# Patient Record
(demographics unavailable — no encounter records)

---

## 2019-08-01 NOTE — EMERGENCY DEPARTMENT REPORT
ED Chest Pain HPI





- General


Chief Complaint: Chest Pain


Stated Complaint: POSS STROKE


Time Seen by Provider: 07/31/19 23:09


Source: patient


Mode of arrival: Ambulatory


Limitations: No Limitations





- History of Present Illness


Initial Comments: 





Reports that he drinks approximately a 6-pack of beer on the weekends. 


MD Complaint: chest pain


-: Gradual, hour(s)


Onset: during rest


Pain Location: substernal


Pain Radiation: none


Severity: mild


Severity scale (0 -10): 3


Quality: aching


Consistency: intermittent


Improves With: nothing


Worsens With: nothing


re: other (reports symptoms of arm spasms and paresthesias in UE when he has 

chest pain).  denies: nausea, vomting, diaphoresis, dyspnea, sense of impending 

doom


Other Symptoms: denies: cough, fever, syncope, rash, acid taste in mouth, leg 

swelling, palpitations, burping





- Related Data


                                  Previous Rx's











 Medication  Instructions  Recorded  Last Taken  Type


 


Acetaminophen/Codeine 1 tab PO Q6H PRN #20 tab 03/20/15 Unknown Rx





[Acetaminophen-Codeine #3 TAB]    


 


Ibuprofen [Motrin] 600 mg PO Q8H PRN #40 tablet 03/20/15 Unknown Rx


 


Sulfamethoxazole/Trimethoprim 1 each PO BID #20 tablet 03/20/15 Unknown Rx





[Bactrim Ds]    











                                    Allergies











Allergy/AdvReac Type Severity Reaction Status Date / Time


 


No Known Allergies Allergy   Verified 03/20/15 01:47














Heart Score





- HEART Score


History: Moderately suspicious


EKG: Non-specific


Age: 45-65


Risk factors: 1-2 risk factors


Troponin: < normal limit


HEART Score: 4





ED Review of Systems


ROS: 


Stated complaint: POSS STROKE


Other details as noted in HPI





Other: 





GENERAL: No weight change, fatigue, weakness, fever, chills, or night sweats


SKIN: No changes in skin or hair, no itching, no rashes, no jaundice


HEAD: No trauma, headache, or visual changes


EYES: No blurriness, tearing, itching, acute visual loss, conjunctival 

discoloration, or scleral icterus


EARS: No hearing loss, tinnitus, vertigo, or earache


NOSE: No rhinorrhea, stuffiness, sneezing, itching, or epistaxis


MOUTH: No bleeding gums, hoarseness, sore throat, or swelling


CARDIAC: Chest Pain.   No new murmur, palpitations, dyspnea on exertion, 

orthopnea, PND, or edema


RESPIRATORY: No shortness of breath, wheeze, cough, sputum production, 

hemoptysis, pneumonia, asthma, bronchitis, or emphysema


GI: No change in appetite, nausea, vomiting, dysphagia, change in bowel 

frequency, diarrhea, constipation, bleeding, hematemesis, melena, hematochezia, 

or abdominal pain


URINARY: No frequency, urgency, polyuria, dysuria, hematuria, or incontinence


MUSCULOSKELETAL: Bilateral UE spasms.  No muscle weakness, joint stiffness, decr

ease in range of motion, redness, swelling


NEUROLOGIC: Tingling bilateral UE.  No loss of sensation, numbness, tremors, 

weakness, paralysis, seizures


HEMATOLOGIC: No anemia, easy bruising, bleeding, petechiae, or purpura


ENDOCRINE: No hot or cold intolerance, sweating, polyuria, polydipsia or, polyp

hagia no thyroid problems


PSYCHIATRIC: No change in mood, no anxiety, no depression





ED Past Medical Hx





- Past Medical History


Previous Medical History?: Yes


Hx Hypertension: Yes


Hx Diabetes: Yes





- Surgical History


Past Surgical History?: No





- Social History


Smoking Status: Current Every Day Smoker


Substance Use Type: Alcohol





- Medications


Home Medications: 


                                Home Medications











 Medication  Instructions  Recorded  Confirmed  Last Taken  Type


 


Acetaminophen/Codeine 1 tab PO Q6H PRN #20 tab 03/20/15  Unknown Rx





[Acetaminophen-Codeine #3 TAB]     


 


Ibuprofen [Motrin] 600 mg PO Q8H PRN #40 tablet 03/20/15  Unknown Rx


 


Sulfamethoxazole/Trimethoprim 1 each PO BID #20 tablet 03/20/15  Unknown Rx





[Bactrim Ds]     














ED Physical Exam





- General


Limitations: No Limitations





- Other


Other exam information: 





GENERAL: Patient in no acute distress


HEAD: Normocephalic, atraumatic


EYES: PERRLA, EOM intact, no scleral icterus, no papilledema, no conjunctival 

hemorrhage, visual fields and acuity wnl,


NOSE: No tenderness, discharge, sinus tenderness


MOUTH: No erythema, bleeding, exudate


HEART: Regular rate and rhythm, no murmur, S1-S2 are auscultated, pulses are 

symmetric


LUNGS: No wheezing, rales, rhonchi, bilateral breath sounds


ABDOMEN: Normal bowel sounds, no tenderness, no rebound, no guarding, no masses,

no CVA tenderness


MUSCULOSKELETAL: Normal joint range of motion, no redness, no swelling, no te

nderness


NEUROLOGIC: GCS 15, Alert and Oriented x3, Cranial nerves intact, normal 

sensation, normal strength, normal gait, no cerebellar deficit


PSYCHIATRIC: No homicidal or suicidal ideation, no anxiety, no depression, no 

hallucinations


SKIN: Skin is warm and dry, no wounds, no rashes





ED Course


                                   Vital Signs











  07/31/19 07/31/19 08/01/19





  22:56 23:18 00:00


 


Temperature 98 F 98.1 F 


 


Pulse Rate 113 H 89 88


 


Respiratory 20 22 24





Rate   


 


Blood Pressure 117/87  118/70


 


Blood Pressure  126/78 





[Left]   


 


O2 Sat by Pulse 98 100 100





Oximetry   














  08/01/19





  01:00


 


Temperature 


 


Pulse Rate 86


 


Respiratory 31 H





Rate 


 


Blood Pressure 128/77


 


Blood Pressure 





[Left] 


 


O2 Sat by Pulse 100





Oximetry 














ED Medical Decision Making





- Lab Data


Result diagrams: 


                                 07/31/19 23:11





                                 07/31/19 23:11








                         Laboratory Results - last 24 hr











  07/31/19 07/31/19 07/31/19





  22:57 23:11 23:11


 


WBC   6.0 


 


RBC   4.50 


 


Hgb   12.7 


 


Hct   37.8 


 


MCV   84 


 


MCH   28 


 


MCHC   34 


 


RDW   14.9 


 


Plt Count   136 L 


 


Sodium    132 L


 


Potassium    4.2


 


Chloride    94.1 L


 


Carbon Dioxide    23


 


Anion Gap    19


 


BUN    12


 


Creatinine    1.0


 


Estimated GFR    > 60


 


BUN/Creatinine Ratio    12


 


Glucose    190 H


 


POC Glucose  206 H  


 


Calcium    9.3


 


Magnesium   


 


Total Bilirubin    1.90 H


 


AST    159 H


 


ALT    248 H


 


Alkaline Phosphatase    135 H


 


Troponin T    < 0.010


 


Total Protein    7.1


 


Albumin    3.7 L


 


Albumin/Globulin Ratio    1.1


 


Urine Color   


 


Urine Turbidity   


 


Urine pH   


 


Ur Specific Gravity   


 


Urine Protein   


 


Urine Glucose (UA)   


 


Urine Ketones   


 


Urine Blood   


 


Urine Nitrite   


 


Urine Bilirubin   


 


Urine Urobilinogen   


 


Ur Leukocyte Esterase   


 


Urine WBC (Auto)   


 


Urine RBC (Auto)   


 


U Epithel Cells (Auto)   


 


Urine Mucus   


 


Urine Opiates Screen   


 


Urine Methadone Screen   


 


Ur Barbiturates Screen   


 


Ur Phencyclidine Scrn   


 


Ur Amphetamines Screen   


 


U Benzodiazepines Scrn   


 


Urine Cocaine Screen   


 


U Marijuana (THC) Screen   


 


Drugs of Abuse Note   


 


Plasma/Serum Alcohol   














  07/31/19 07/31/19 07/31/19





  23:11 23:59 23:59


 


WBC   


 


RBC   


 


Hgb   


 


Hct   


 


MCV   


 


MCH   


 


MCHC   


 


RDW   


 


Plt Count   


 


Sodium   


 


Potassium   


 


Chloride   


 


Carbon Dioxide   


 


Anion Gap   


 


BUN   


 


Creatinine   


 


Estimated GFR   


 


BUN/Creatinine Ratio   


 


Glucose   


 


POC Glucose   


 


Calcium   


 


Magnesium  1.80  


 


Total Bilirubin   


 


AST   


 


ALT   


 


Alkaline Phosphatase   


 


Troponin T   


 


Total Protein   


 


Albumin   


 


Albumin/Globulin Ratio   


 


Urine Color   Yellow 


 


Urine Turbidity   Clear 


 


Urine pH   6.0 


 


Ur Specific Gravity   1.012 


 


Urine Protein   <15 mg/dl 


 


Urine Glucose (UA)   Neg 


 


Urine Ketones   Neg 


 


Urine Blood   Neg 


 


Urine Nitrite   Neg 


 


Urine Bilirubin   Neg 


 


Urine Urobilinogen   4.0 


 


Ur Leukocyte Esterase   Neg 


 


Urine WBC (Auto)   1.0 


 


Urine RBC (Auto)   4.0 


 


U Epithel Cells (Auto)   1.0 


 


Urine Mucus   Few 


 


Urine Opiates Screen    Presumptive negative


 


Urine Methadone Screen    Presumptive negative


 


Ur Barbiturates Screen    Presumptive negative


 


Ur Phencyclidine Scrn    Presumptive negative


 


Ur Amphetamines Screen    Presumptive negative


 


U Benzodiazepines Scrn    Presumptive negative


 


Urine Cocaine Screen    Presumptive negative


 


U Marijuana (THC) Screen    Presumptive negative


 


Drugs of Abuse Note    Disclamer


 


Plasma/Serum Alcohol   














  08/01/19 08/01/19





  00:05 02:06


 


WBC  


 


RBC  


 


Hgb  


 


Hct  


 


MCV  


 


MCH  


 


MCHC  


 


RDW  


 


Plt Count  


 


Sodium  


 


Potassium  


 


Chloride  


 


Carbon Dioxide  


 


Anion Gap  


 


BUN  


 


Creatinine  


 


Estimated GFR  


 


BUN/Creatinine Ratio  


 


Glucose  


 


POC Glucose  


 


Calcium  


 


Magnesium  


 


Total Bilirubin  


 


AST  


 


ALT  


 


Alkaline Phosphatase  


 


Troponin T   < 0.010


 


Total Protein  


 


Albumin  


 


Albumin/Globulin Ratio  


 


Urine Color  


 


Urine Turbidity  


 


Urine pH  


 


Ur Specific Gravity  


 


Urine Protein  


 


Urine Glucose (UA)  


 


Urine Ketones  


 


Urine Blood  


 


Urine Nitrite  


 


Urine Bilirubin  


 


Urine Urobilinogen  


 


Ur Leukocyte Esterase  


 


Urine WBC (Auto)  


 


Urine RBC (Auto)  


 


U Epithel Cells (Auto)  


 


Urine Mucus  


 


Urine Opiates Screen  


 


Urine Methadone Screen  


 


Ur Barbiturates Screen  


 


Ur Phencyclidine Scrn  


 


Ur Amphetamines Screen  


 


U Benzodiazepines Scrn  


 


Urine Cocaine Screen  


 


U Marijuana (THC) Screen  


 


Drugs of Abuse Note  


 


Plasma/Serum Alcohol  < 0.01 














- EKG Data


When compared to previous EKG there are: no significant change





- Radiology Data


Radiology results: report reviewed





- Medical Decision Making





At 0511 patient comfortable.  Plan admit for further evaluation.  Lung changes 

but no symptoms of pneumonia.  Plan evaluate for chest pain rule out.  Dr. Tierney

 updated and accepts admission. 


Critical care attestation.: 


If time is entered above; I have spent that time in minutes in the direct care 

of this critically ill patient, excluding procedure time.








ED Disposition


Clinical Impression: 


 Hyperbilirubinemia, Elevated LFTs





Chest pain


Qualifiers:


 Chest pain type: unspecified Qualified Code(s): R07.9 - Chest pain, unspecified





Pneumonia


Qualifiers:


 Pneumonia type: due to unspecified organism Laterality: unspecified laterality 

Lung location: unspecified part of lung Qualified Code(s): J18.9 - Pneumonia, 

unspecified organism





Disposition: DC-09 OP ADMIT IP TO THIS HOSP


Is pt being admited?: Yes


Condition: Stable


Instructions:  Chest Pain (ED), Bacterial Pneumonia (ED)


[facility] 


[facility] - 3-5 Days


Time of Disposition: 05:06

## 2019-08-01 NOTE — CAT SCAN REPORT
CT chest w con, CT abdomen pelvis w con 



INDICATION / CLINICAL INFORMATION:

Chest Pain, abdomen and pelvic pain, elevated liver function tests.



TECHNIQUE:

100 cc of Omnipaque 350 was administered intravenously All CT scans at this location are performed us
ing CT dose reduction for ALARA by means of automated exposure control. 



COMPARISON:

None available.



FINDINGS:

Diffuse airspace disease is seen in the left apex there are a few bulla seen lower in the left lung. 
No other significant parenchymal abnormality is identified. No enlarged mediastinal or hilar lymph no
kannan are identified. No significant skeletal abnormality is seen in the thorax.



In the abdomen, no free fluid is seen. There are some fluid-filled loops of small bowel present. Diff
use fatty infiltration is present in the liver without focal abnormality. The spleen, kidneys, pancre
as and adrenal glands are normal. No enlarged retroperitoneal lymph nodes are seen. Diffuse atheroscl
erotic changes present without evidence of an aneurysm.



In the pelvis, no free fluid is seen. No enlarged lymph nodes are identified. The bladder and the fabiana
endix are normal. No significant skeletal abnormality is identified.



IMPRESSION:



1. Airspace disease in the left apex. There are a few bulla present more inferiorly in the left lung

2. Fluid-filled loops of small bowel could represent mild enteritis

3. Diffuse fatty infiltration in the liver without focal abnormality

4. Atherosclerotic change without evidence of an aneurysm



Signer Name: Chucho Gonzalez MD FACR 

Signed: 8/1/2019 4:31 AM

 Workstation Name: [id number]

## 2019-08-01 NOTE — HISTORY AND PHYSICAL REPORT
CHIEF COMPLAINT:  Chest pain.



HISTORY OF PRESENT ILLNESS:  The patient is a 60-year-old male who said he was

having sharp retrosternal chest pain that started yesterday.  Pain is associated

with shortness of breath, nausea, but no vomiting.  The pain is also associated

with tingling sensation in the left upper extremity.  There was no history of

dizziness.  No history of diaphoresis and no history of cough or fever and

patient presented for evaluation.



PAST MEDICAL HISTORY:  Pertinent for diabetes mellitus.  Also, the patient has

past history of hypertension.



FAMILY HISTORY:  Pertinent for coronary artery disease in the mother.



SOCIAL HISTORY:  The patient smokes cigarettes.  Does not drink alcohol and does

not use illicit drugs.



MEDICATIONS:  The patient's home medications involve Tylenol No. 3 one by mouth

every 6 hours as needed for pain, Motrin 600 mg by mouth every 8 hours as needed

for pain, Bactrim-DS 1 by mouth twice daily as needed for pain.



ALLERGIES:  There are no known drug allergies.



REVIEW OF SYSTEMS:

CONSTITUTIONAL:  There is no fever, no chills, no diaphoresis.

HEENT:  There is no headache or sore throat.

CARDIOVASCULAR SYSTEM:  Chest pain is present.  No orthopnea.

RESPIRATORY SYSTEM:  Shortness of breath is present.  No cough.

GASTROINTESTINAL SYSTEM:  There is nausea, but no vomiting.  No abdominal pain,

diarrhea or constipation.

NEUROLOGICAL SYSTEM:  Numbness and tingling sensation in the left upper

extremity noted.  No dizziness, no altered mental status.

MUSCULOSKELETAL SYSTEM:  There is no joint pain or swelling.

DERMATOLOGICAL SYSTEM:  There is no skin rash or itching.

GENITOURINARY SYSTEM:  There is no dysuria, hematuria, or flank pain.

Rest of system review is normal.



PHYSICAL EXAMINATION:

GENERAL:  At the time of exam, the patient was found to be alert, oriented x 3

and not in acute distress.

VITAL SIGNS:  At the initial time of presentation shows temperature of 98

degrees Fahrenheit, pulse of 113, respirations 20, blood pressure 117/87, O2 sat

of 98% on room air.

HEENT:  Showed pupils to be equal, round, reactive to light and accommodating. 

Extraocular muscles are intact.

NECK:  Supple with no JVD or carotid bruit.

CARDIOVASCULAR SYSTEM:  Showed normal first and second heart sounds with no

gallops or murmurs.

RESPIRATORY SYSTEM:  Show good air entry on both sides of the lungs with no

abnormal breath sounds.

GASTROINTESTINAL SYSTEM:  Show abdomen to be full, soft, nontender with no

organomegaly or rigidity.

NEUROLOGICAL:  Shows no focal deficit.

MUSCULOSKELETAL SYSTEM:  Show no joint swelling or tenderness.

DERMATOLOGICAL SYSTEM:  Show no skin rash.

GENITOURINARY SYSTEM:  Showing no costovertebral angle tenderness.



PERTINENT LABORATORY DATA AND IMAGING STUDIES:  The patient has CT of the

abdomen and pelvis done that shows airspace disease in the left apex of the

heart.  Also, there is finding of fluid filled loops of small bowel that the

radiologist say may represent mild enteritis.  There is finding of diffuse fatty

infiltrate in the liver without focal abnormality.  Also, the CT abdomen and

pelvis with contrast shows atherosclerotic change without evidence of an

aneurysm.  Also, the patient has CT of the chest done that shows the same thing

as CT of the abdomen and pelvis. The patient had chest x-ray done that shows

increased density in the left apical region with recommendation to do a CT of

the chest.  The patient's lab result shows CBC with normal white count, normal

hemoglobin and normal hematocrit with low platelet count of 136,000.  The

patient's chemistry shows a low sodium level of 132 with low chloride level of

94.1 and elevated blood glucose of 206.  The patient's liver transaminases show

high AST of 159 and high ALT of 248 with elevated total bilirubin of 1.9 and the

patient's urinalysis came back unremarkable.  Troponin level came back normal

and patient's toxicology screen was unremarkable and the patient's plasma/serum

alcohol level was unremarkable.



DIAGNOSES:

1.  Chest pain.

2.  Transaminitis.

3.  Enteritis.

4.  Left apex airspace disease.



PLAN OF CARE:

1.  The patient will be admitted to telemetry.

2.  The patient will have serial cardiac enzymes involving troponin, total CK,

and CK-MB checked every 6 hours x 2 more levels.

3.  The patient will have Lexiscan stress test done this morning and will remain

n.p.o. for the stress test.

4.  The patient will have Accu-Chek q.4 hours followed by low-dose sliding scale

using regular insulin treatment.

5.  The patient will be on Tylenol 650 mg by mouth every 4 hours for fever and

headache.

6.  The patient will be on aspirin 325 mg by mouth daily.

7.  The patient will be on IV morphine 2 mg every 5 minutes as needed for chest

pain and will be on nitro paste half inch to anterior chest wall q.i.d.  Also,

the patient will be on Nitrostat 0.4 mg sublingual every 5 minutes as needed for

breakthrough chest pain.

8.  The patient will be on IV Zofran 4 mg every 8 hours for nausea and vomiting.

9.  The patient will be on IV Levaquin 750 mg daily for treatment of enteritis

and also the patient will be on IV metronidazole 500 mg every 8 hours for

treatment of enteritis.

10.  The patient will have Lexiscan stress test done this morning.

11. gastroenterology consult with [facility] for evaluation of fatty 
liver with Transaminitis





DD: 08/01/2019 05:44

DT: 08/01/2019 05:56

JOB# [id number]  [id number]

OCN/NTS

MTDD

## 2019-08-01 NOTE — CAT SCAN REPORT
CT chest w con, CT abdomen pelvis w con 



INDICATION / CLINICAL INFORMATION:

Chest Pain, abdomen and pelvic pain, elevated liver function tests.



TECHNIQUE:

100 cc of Omnipaque 350 was administered intravenously All CT scans at this location are performed us
ing CT dose reduction for ALARA by means of automated exposure control. 



COMPARISON:

None available.



FINDINGS:

Diffuse airspace disease is seen in the left apex there are a few bulla seen lower in the left lung. 
No other significant parenchymal abnormality is identified. No enlarged mediastinal or hilar lymph no
kannan are identified. No significant skeletal abnormality is seen in the thorax.



In the abdomen, no free fluid is seen. There are some fluid-filled loops of small bowel present. Diff
use fatty infiltration is present in the liver without focal abnormality. The spleen, kidneys, pancre
as and adrenal glands are normal. No enlarged retroperitoneal lymph nodes are seen. Diffuse atheroscl
erotic changes present without evidence of an aneurysm.



In the pelvis, no free fluid is seen. No enlarged lymph nodes are identified. The bladder and the fabiana
endix are normal. No significant skeletal abnormality is identified.



IMPRESSION:



1. Airspace disease in the left apex. There are a few bulla present more inferiorly in the left lung

2. Fluid-filled loops of small bowel could represent mild enteritis

3. Diffuse fatty infiltration in the liver without focal abnormality

4. Atherosclerotic change without evidence of an aneurysm



Signer Name: Chucho Gonzalez MD FACR 

Signed: 8/1/2019 4:31 AM

 Workstation Name: [id number]

## 2019-08-01 NOTE — DISCHARGE SUMMARY
Providers





- Providers


Date of Admission: 


08/01/19 05:07





Date of discharge: 08/01/19


Attending physician: 


KYLE GALLAGHER





                                        





08/01/19 06:48


Consult to Physician [CONS] Routine 


   Comment: 


   Consulting Provider: RITU BLAKE


   Physician Instructions: 


   Reason For Exam: Fatty Liver with Transaminitis











Primary care physician: 


[facility] PRITESH TOBIN MD








Hospitalization


Condition: Stable


Hospital course: 


Patient is a 59 yo man with a history of type 2 DM, hypertension, tobacco and 

alcohol dependency who presented with left sided chest pains after lifting heavy

 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 He developed sharp arm pains and leg cramping followed by chest

 pains. It appears he is in denial about how much he drinks beers. Initially he 

mentioned drinking beers (12 pack) over the weekend then he admits to drinking 

24 oz beer almost daily (he states that he doesn't drink beer every single day 

so he is not an Alcoholic). CT chest/abd/pelvis reviewed with Radiologist. 





Discharge Diagnoses:


Chest pains, stress test negative, most likely costochondritis


Fatty liver with transaminitis, most likely related to ETOH, counseling done, 

needs outpat GI referral 


Left lung apex disease, d/w Radiologist Dr. Randolph, unlikely pna, appears 

chronic. 


No Enteritis, no history of diarrhea. 


Tobacco dependency,  on stopping


ETOH dependency  on stopping


Malnutrition suspected





Disposition: DC-01 TO HOME OR SELFCARE


Time spent for discharge: 35 minutes





Core Measure Documentation





- Palliative Care


Palliative Care/ Comfort Measures: Not Applicable





- Core Measures


Any of the following diagnoses?: none





- VTE Discharge Requirements


Deep Vein Thrombosis/Pulmonary Embolism Present on Admission: No


Has pt received <5 days of overlap therapy or INR<2.0: No


Anticoagulant overlap therapy prescribed at discharge: No


Contraindication No Overlap Therapy order at DC: Not Indicated





Exam





- Physical Exam


Narrative exam: 


Gen: WDWN, NAD, Awake, Alert, Orientated 


HEENT: NCAT, EOMI, PERRL, OP Clear 


Neck: supple, no adenopathy, no thyromegaly, no JVD 


CVS/Heart: RRR, normal S1S2, pulses present bilaterally 


Chest/Lungs: CTA B, Symmetrical chest expansion, good air entry bilaterally, 

reproducible left chest wall tenderness with touch


GI/Abdomen: soft, NTND, good bowel sounds, no guarding or rebound 


/Bladder: no suprapubic tenderness, no CVA or paraspinal tenderness 


Extermity/Skin: no c/c/e, no obvious rash 


MSK: FROM x 4 


Neuro: CN 2-12 grossly intact, no new focal deficits 


Psych: calm 





- Constitutional


Vitals: 


                                        











Temp Pulse Resp BP Pulse Ox


 


 98.1 F   76   18   113/68   100 


 


 08/01/19 06:25  08/01/19 12:09  08/01/19 12:09  08/01/19 12:09  08/01/19 12:09














Plan


Activity: other (no strenous activity)


Diet: regular


Special Instructions: smoking cessation


Additional Instructions: Take over the counter Thiamine/Vitamin B1 daily with 

folic acid


[facility]: 


[facility] - [facility]-5 Days


DALTON MCNEAL MD [Staff Physician] - 7 Days

## 2019-08-01 NOTE — CONSULTATION
History of Present Illness





- Reason for Consult


Consult date: 08/01/19


abnormal LFTs


Requesting physician: KYLE GALLAGHER





- History of Present Illness


82 yo  and [place] man, admitted with chest pain with negative cardiac

evaluation, who was found to have abnormal LFTs with AST/ALT/ALP/TBili = 

159/248/135/1.9, and fatty liver on CT.  Pt drinks 12 pk beer/wk.  Denies known 

hx of liver disease.  Hepatitis serologies negative.  He is on cholesterol meds 

at home.  No family hx of colon cancer.


He denies abd pain, N/V.  He states he had NISSA x 1-2 months, but is recovering, 

and states he lost ~20#.  He is on Nexium with good control of GERD.  No change 

in BMs, no dysphagia or early satiety.  No GI bleed.





Meds reviewed.








Past History


Past Medical History: diabetes, hypertension, hyperlipidemia


Past Surgical History: No surgical history


Social history: smoking, alcohol abuse


Family history: no significant family history





Medications and Allergies


                                    Allergies











Allergy/AdvReac Type Severity Reaction Status Date / Time


 


No Known Allergies Allergy   Verified 03/20/15 01:47











                                Home Medications











 Medication  Instructions  Recorded  Confirmed  Last Taken  Type


 


Esomeprazole Magnesium [Nexium 20 mg PO DAILY 08/01/19 08/01/19 Unknown History





24Hr]     


 


metFORMIN [Glucophage] 500 mg PO BID 08/01/19 08/01/19 Unknown History











Active Meds: 


Active Medications





Acetaminophen (Tylenol)  650 mg PO Q4H PRN


   PRN Reason: Pain MILD(1-3)/Fever >100.5/HA


Aspirin (Aspirin)  325 mg PO QDAY [facility]ANDA


   Last Admin: 08/01/19 10:21 Dose:  325 mg


   Documented by: 


Dextrose (D50w (25gm) Syringe)  50 ml IV PRN PRN


   PRN Reason: Hypoglycemia


Levofloxacin/Dextrose (Levaquin 750mg/150ml)  750 mg in 150 mls @ 100 mls/hr IV 

Q24HR [facility]; Protocol


Metronidazole (Flagyl 500 Mg/100 Ml)  500 mg in 100 mls @ 100 mls/hr IV Q8H SHAWANDA;

Protocol


Insulin Human Regular (Humulin R)  0 units SUB-Q Q4H [facility]; Protocol


   Last Admin: 08/01/19 10:33 Dose:  Not Given


   Documented by: 


Morphine Sulfate (Morphine)  2 mg IV Q5MIN PRN


   PRN Reason: Chest Pain unrelieved by NTG


Nitroglycerin (Nitro-Bid 2%)  0.5 inch TP QIDNTG [facility]; Protocol


   Last Admin: 08/01/19 10:21 Dose:  0.5 inch


   Documented by: 


Ondansetron HCl (Zofran)  4 mg IV Q8H PRN


   PRN Reason: Nausea And Vomiting


Sodium Chloride (Sodium Chloride Flush Syringe 10 Ml)  10 ml IV BID [facility]


   Last Admin: 08/01/19 13:53 Dose:  10 ml


   Documented by: 


Sodium Chloride (Sodium Chloride Flush Syringe 10 Ml)  10 ml IV PRN PRN


   PRN Reason: LINE FLUSH











Review of Systems


All systems: negative (as per HPI)





Exam





- Constitutional


Vitals: 


                                        











Temp Pulse Resp BP Pulse Ox


 


 98.1 F   76   18   113/68   100 


 


 08/01/19 06:25  08/01/19 12:09  08/01/19 12:09  08/01/19 12:09  08/01/19 12:09











General appearance: Present: no acute distress





- EENT


Eyes: Present: PERRL, EOM intact


ENT: hearing intact





- Respiratory


Respiratory effort: normal


Respiratory: bilateral: CTA





- Cardiovascular


Rhythm: regular


Heart Sounds: Present: S1 & S2





- Extremities


Extremities: No edema





- Abdominal


General gastrointestinal: Present: soft, non-tender





Results





- Labs


CBC & Chem 7: 


                                 07/31/19 23:11





                                 07/31/19 23:11


Labs: 


                              Abnormal lab results











  07/31/19 07/31/19 07/31/19 Range/Units





  22:57 23:11 23:11 


 


Plt Count   136 L   (140-440)  K/mm3


 


Sodium    132 L  (137-145)  mmol/L


 


Chloride    94.1 L  ()  mmol/L


 


Glucose    190 H  ()  mg/dL


 


POC Glucose  206 H    ()  


 


Total Bilirubin    1.90 H  (0.1-1.2)  mg/dL


 


AST    159 H  (5-40)  units/L


 


ALT    248 H  (7-56)  units/L


 


Alkaline Phosphatase    135 H  ()  units/L


 


Total Creatine Kinase     ()  units/L


 


CK-MB (CK-2)     (0.0-4.0)  ng/mL


 


Albumin    3.7 L  (3.9-5)  g/dL














  08/01/19 08/01/19 Range/Units





  06:18 06:44 


 


Plt Count    (140-440)  K/mm3


 


Sodium    (137-145)  mmol/L


 


Chloride    ()  mmol/L


 


Glucose    ()  mg/dL


 


POC Glucose  109 H   ()  


 


Total Bilirubin    (0.1-1.2)  mg/dL


 


AST    (5-40)  units/L


 


ALT    (7-56)  units/L


 


Alkaline Phosphatase    ()  units/L


 


Total Creatine Kinase   351 H  ()  units/L


 


CK-MB (CK-2)   4.7 H  (0.0-4.0)  ng/mL


 


Albumin    (3.9-5)  g/dL














- Imaging and Cardiology


CT scan - abdomen: report reviewed





Assessment and Plan


1.  Abnormal LFTs - etiology unclear.  May be due to statins, which I believe 

the pt is taking at home.  Could be acute, or other chronic condition.


- okay to D/C home from GI standpoint


- no statins and no beer


- f/u as outpatient in 2-3 wks for repeat labs and further evaluation

## 2019-08-02 NOTE — TREADMILL REPORT
NUCLEAR STRESS TEST



The patient is here for a nuclear stress test.



REFERRING PHYSICIAN:  Hospitalist service.



PROTOCOL:  The patient was brought to the stress lab in a postoperative state,

given 10 mCi of technetium 99m at rest.  The patient underwent rest imaging. 

The patient underwent Lexiscan stress test per standard protocol.  At peak

stress, the patient was given 26 mCi technetium 99m.  Shortly thereafter, the

patient underwent stress imaging.  Raw imaging reveals mild GI artifact with no

significant motion artifact.  SPECT imaging examined carefully in horizontal

long axis, vertical long axis, short axis views.  There is normal homogenous

uptake of radioisotope in all reported segments.  No evidence of significant

fixed or reversible perfusion defects suggestive of prior infarction or

ischemia.  Gated wall motion reveals normal systolic thickening, calculated

ejection fraction 67%.  No TID.



CONCLUSIONS:

1.  Normal myocardial perfusion scan without evidence of active ischemia or

prior infarction.

2.  Normal left ventricular systolic performance without evidence of transient

ischemic dilatation or stress-induced segmental wall motion abnormalities.

3.  Lexiscan stress test reported separately.





DD: 08/01/2019 10:55

DT: 08/01/2019 12:33

JOB# [id number]  [id number]

SBIRIS/TONY

## 2019-08-23 NOTE — CAT SCAN REPORT
CT lumbar spine wo con



INDICATION / CLINICAL INFORMATION:

60 years Male; fall, back pain.



TECHNIQUE:

Axial CT images of the lumbar spine were obtained after administration of intrathecal contrast.  Sagi
ttal and coronal reformatted images were produced. All CT scans at this location are performed using 
CT dose reduction for ALARA by means of automated exposure control.



COMPARISON: 

None available.



FINDINGS:



POST-SURGICAL CHANGES: None.



ALIGNMENT: No significant abnormality.

VERTEBRAE: Healing transverse process fractures seen on the right at L3 and L4.  There may be mild lo
ss of height at L5. No definitive signs of acute injury appreciated at this level.



Mild facet hypertrophy seen at various levels.



INTERVERTEBRAL DISCS: Minimal disc bulge seen at L4-5 resulting in mild thecal sac narrowing. There i
s also mild to moderate foraminal narrowing bilaterally at this level without impingement.



PARASPINAL SOFT TISSUES: No significant abnormality.



ADDITIONAL FINDINGS: Atherosclerotic disease seen in the aorta and its branches.



There is fusion of the sacroiliac joints bilaterally, anteriorly.



IMPRESSION:

1. No signs of acute bony trauma to the lumbar spine.  Subacute injury suspected, as described above.




Signer Name: Marcelo Richards MD, III 

Signed: 8/23/2019 5:02 PM

 Workstation Name: [id number]

## 2019-08-23 NOTE — CAT SCAN REPORT
CT ABDOMEN AND PELVIS WITHOUT CONTRAST



INDICATION / CLINICAL INFORMATION:

fall, left flank pain.



TECHNIQUE:

Axial CT images were obtained through the abdomen and pelvis without IV contrast.  All CT scans at [facility] are performed using CT dose reduction for ALARA by means of automated exposure control. 



COMPARISON:

CT scan dated 8/1/2019



FINDINGS:

LOWER CHEST: Scarring is noted in the left lung base. There are calcified pleural plaques in the left
 base.

LIVER: Unchanged

GALLBLADDER: No significant abnormality.  

BILE DUCTS: No significant abnormality.

PANCREAS: No significant abnormality.

SPLEEN: No significant abnormality.

ADRENALS: No significant abnormality.

RIGHT KIDNEY and URETER: No significant abnormality.

LEFT KIDNEY and URETER: No significant abnormality.



STOMACH and SMALL BOWEL: No significant abnormality. 

COLON: Moderate amount stool is noted in the colon. 

APPENDIX: No significant abnormality.  

PERITONEUM: No free fluid. No free air. No fluid collection.

LYMPH NODES: No significant adenopathy.

AORTA and ARTERIES: Atherosclerotic calcifications are noted in the aorta and iliac arteries. The aor
ta is normal in diameter. 

IVC and VEINS: No significant abnormality.



URINARY BLADDER: No significant abnormality.

REPRODUCTIVE ORGANS: No significant abnormality.



ADDITIONAL FINDINGS: None.



SKELETAL SYSTEM: No acute osseous abnormalities are seen.



IMPRESSION:

1. There is no obstruction, inflammation, or free air. No acute abnormality is seen in the abdomen or
 pelvis. No intra-abdominal organ injury is identified.



Signer Name: Dick Guzman MD 

Signed: 8/23/2019 4:22 PM

 Workstation Name: [id number]

## 2019-08-23 NOTE — EMERGENCY DEPARTMENT REPORT
HPI





<ANDREIA WERNER - Last Filed: 08/23/19 22:09>





- HPI


HPI: 





60-year-old -American male presents to the emergency department with a 

complaint of some left flank pain and left lateral back pain after the patient 

says he fell off a ladder and hit those areas on the counter in the kitchen.  He

denies hitting his head or any loss of consciousness.  He denies any numbness or

paresthesias or any neurological deficits.  He denies any past medical history. 

Patient denies any current alcohol or illicit drug use.  He has not taken 

anything, and received anything, for his symptoms prior to arrival.





<ELAINE DUBOIS - Last Filed: 08/23/19 22:24>





- General


Chief Complaint: Back Pain/Injury


Time Seen by Provider: 08/23/19 14:04





ED Past Medical Hx





<ANDREIA WERNER - Last Filed: 08/23/19 22:09>





- Past Medical History


Hx Hypertension: Yes


Hx Heart Attack/AMI: No


Hx Congestive Heart Failure: No


Hx Diabetes: Yes


Hx Deep Vein Thrombosis: No


Hx GERD: Yes


Hx Liver Disease: No


Hx Asthma: No


Hx COPD: No





- Surgical History


Hx Coronary Stent: No


Hx Pacemaker: No


Hx Internal Defibrillator: No





- Social History


Smoking Status: Never Smoker


Substance Use Type: Alcohol





<ELAINE DUBOIS - Last Filed: 08/23/19 22:24>





- Medications


Home Medications: 


                                Home Medications











 Medication  Instructions  Recorded  Confirmed  Last Taken  Type


 


Esomeprazole Magnesium [Nexium 20 mg PO DAILY 08/01/19 08/23/19 08/22/19 History





24Hr]     


 


metFORMIN [Glucophage] 500 mg PO BID 08/01/19 08/23/19 08/22/19 History


 


Lisinopril [Zestril] 5 mg PO QDAY 08/23/19 08/23/19 08/22/19 History














ED Review of Systems


ROS: 


Stated complaint: BACK PAIN/ETOH


Other details as noted in HPI








<ANDREIA WERNER - Last Filed: 08/23/19 22:09>


ROS: 


Stated complaint: BACK PAIN/ETOH


Other details as noted in HPI





Comment: All other systems reviewed and negative


Constitutional: denies: chills, fever


Eyes: denies: eye pain, vision change


ENT: denies: ear pain, throat pain


Respiratory: denies: cough, shortness of breath


Cardiovascular: denies: chest pain, palpitations


Gastrointestinal: abdominal pain (left flank).  denies: vomiting


Genitourinary: denies: dysuria, discharge


Musculoskeletal: back pain.  denies: joint swelling


Skin: denies: rash, change in color


Neurological: denies: headache, weakness, numbness, paresthesias





<ELAINE DUBOIS S - Last Filed: 08/23/19 22:24>





Physical Exam





- Physical Exam


Vital Signs: 


                                   Vital Signs











  08/23/19 08/23/19





  13:56 17:11


 


Temperature 98 F 


 


Pulse Rate 110 H 74


 


Respiratory 16 16





Rate  


 


Blood Pressure 145/91 


 


Blood Pressure  135/75





[Left]  


 


O2 Sat by Pulse 96 96





Oximetry  














<ANDREIA WERNER - Last Filed: 08/23/19 22:09>





- Physical Exam


Vital Signs: 


                                   Vital Signs











  08/23/19





  13:56


 


Temperature 98 F


 


Pulse Rate 110 H


 


Respiratory 16





Rate 


 


Blood Pressure 145/91


 


O2 Sat by Pulse 96





Oximetry 











Physical Exam: 





GENERAL: The patient is well-developed well-nourished.


HENT: Normocephalic.  Atraumatic.    Patient has moist mucous membranes.


EYES: Extraocular motions are intact.  


NECK: Supple. Trachea is midline.


CHEST/LUNGS: Clear to auscultation.  There is no respiratory distress noted.


HEART/CARDIOVASCULAR: Regular.  There is no tachycardia.  There is no murmur.


ABDOMEN: Abdomen is soft, nontender.  Patient has normal bowel sounds.  There is

 no abdominal distention.


SKIN: Skin is warm and dry.


NEURO: The patient is awake, alert, and oriented.  The patient is cooperative.  

The patient has no focal neurologic deficits.  Normal speech. CN II - XII 

grossly intact. 


MUSCULOSKELETAL: Full range of motion. No TTP to the extremities or any obvious 

deformity.


BACK: No midline thoracic or lumbar TTP, step off or deformity. There is some 

reproducible left lateral back TTP.  





<ELAINE DUBOIS S - Last Filed: 08/23/19 22:24>





ED Course


                                   Vital Signs











  08/23/19 08/23/19





  13:56 17:11


 


Temperature 98 F 


 


Pulse Rate 110 H 74


 


Respiratory 16 16





Rate  


 


Blood Pressure 145/91 


 


Blood Pressure  135/75





[Left]  


 


O2 Sat by Pulse 96 96





Oximetry  














<ANDREIA WERNER - Last Filed: 08/23/19 22:09>


                                   Vital Signs











  08/23/19





  13:56


 


Temperature 98 F


 


Pulse Rate 110 H


 


Respiratory 16





Rate 


 


Blood Pressure 145/91


 


O2 Sat by Pulse 96





Oximetry 














<ELAINE DUBOIS - Last Filed: 08/23/19 22:24>





ED Medical Decision Making





- Lab Data


Result diagrams: 


                                 08/23/19 14:45





                                 08/23/19 14:45





- Medical Decision Making





I reassessed Mr. Dacosta.  He is awake alert and ambulatory without difficulty. 

 After 8 hours of observation here in emergency department he is clinically 

sober.  Discharged to self-care.





<ANDREIA WERNER - Last Filed: 08/23/19 22:09>





- Lab Data


Result diagrams: 


                                 08/23/19 14:45





                                 08/23/19 14:45





- Radiology Data


Radiology results: report reviewed





CT of the lumbar spine and CT of the abd/pelvis did not show any acute processes

 as read by radiology. 





- Medical Decision Making





The patient presented with pain to the left flank and lateral back after a fall 

off a ladder. No obvious signs of trauma on examination. CT of the lumbar spine 

and CT of the abd/pelvis did not show any acute process or pathology. Labs 

unremarkable except for alcohol of 0.25. He was given IVF including banana bag. 

The patient was signed out to my colleague to follow until the patient is sober 

or someone can come to get him from the emergency department. He was given arnaldo valdovinos instructions and will return to the ED with any worsening of his 

symptoms or any acute distress. 





- Differential Diagnosis


Fracture, contusion, muscle spasm, lacerations





<ELAINE DUBOIS - Last Filed: 08/23/19 22:24>


Critical care attestation.: 


If time is entered above; I have spent that time in minutes in the direct care 

of this critically ill patient, excluding procedure time.








<ANDREIA WERNER - Last Filed: 08/23/19 22:09>


Critical Care Time: No


Critical care attestation.: 


If time is entered above; I have spent that time in minutes in the direct care 

of this critically ill patient, excluding procedure time.








<ELAINE DUBOIS - Last Filed: 08/23/19 22:24>





ED Disposition


Is pt being admited?: No


Does the pt Need Aspirin: No





<ANDREIA WERNER - Last Filed: 08/23/19 22:09>


Is pt being admited?: No


Time of Disposition: 22:24





<ELAINE DUBOIS - Last Filed: 08/23/19 22:24>


Clinical Impression: 


 Flank pain





Fall


Qualifiers:


 Encounter type: initial encounter Qualified Code(s): W19.XXXA - Unspecified 

fall, initial encounter





Alcohol intoxication


Qualifiers:


 Complication of substance-induced condition: uncomplicated Qualified Code(s): 

F10.920 - Alcohol use, unspecified with intoxication, uncomplicated





Back pain


Qualifiers:


 Back pain location: low back pain Chronicity: acute 





Disposition: DC-01 TO HOME OR SELFCARE


Condition: Stable


Instructions:  Alcohol Intoxication (ED), Back Pain (ED), Fall Prevention (ED)


Additional Instructions: 


Please stop drinking alcohol.  Please follow up with a primary care physician in

 the next few days.  Return to the emergency Department with any worsening of 

your symptoms or any acute distress.


Referrals: 


PRIMARY CARE,MD [Primary Care Provider] - 2-3 Days


ASPEN GIBSON MD [Staff Physician] - 2-3 Days


[facility] [Outside] - 2-3 Days

## 2019-08-28 NOTE — EMERGENCY DEPARTMENT REPORT
ED General Adult HPI





- General


Chief complaint: Abdominal Pain


Stated complaint: ABD PAIN


Time Seen by Provider: 19 14:16


Source: patient, EMS (ems notes not available at time of  chart dictation), RN 

notes reviewed, old records reviewed


Mode of arrival: Stretcher


Limitations: No Limitations, Other (patient appears to be intoxicated)





- History of Present Illness


Initial comments: 





This is a 60-year-old gentleman.  This patient is not known to this provider 

previously.  His past medical history includes type 2 diabetes, hypertension, 

tobacco use, alcohol dependency, chronic pleural disease.  Patient recently 

admitted to this hospital, and has had extensive workup.  He also has a history 

of alcoholism.  He presents to the ER with multiple complaints.  He is a poor 

historian.  He complains of left upper quadrant abdominal pain, left-sided back 

pain, coughing up, nausea, sore throat.  He denies chest pain.  He denies 

exertional shortness of breath.  As per verbal report from nursing staff who is 

taking care of the patient, the patient is quite calm when there is nobody in 

front of his room, but, when there are people in front of his room, he becomes 

quite animated.  The patient is not endorsed any complaints of homicidality or 

suicidality.  He denies urinary symptoms at this time.





He is not accompanied by any friends or family at this time.


-: Gradual, hour(s)


Location: mouth, back, abdomen


Severity scale (0 -10): 10


Quality: aching


Consistency: other (patient not able to describe consistency)


Improves with: none


Worsens with: none





- Related Data


                                Home Medications











 Medication  Instructions  Recorded  Confirmed  Last Taken


 


Esomeprazole Magnesium [Nexium 20 mg PO DAILY 19





24Hr]    


 


metFORMIN [Glucophage] 500 mg PO BID 19


 


Lisinopril [Zestril] 5 mg PO QDAY 19








                                  Previous Rx's











 Medication  Instructions  Recorded  Last Taken  Type


 


Multivitamin with Folic Acid [Cvs 400 mcg PO QDAY #30 tablet 19 Unknown Rx





One Daily Essential Tablet]    


 


chlordiazePOXIDE [Librium] 25 mg PO Q6H PRN #25 capsule 19 Unknown Rx











                                    Allergies











Allergy/AdvReac Type Severity Reaction Status Date / Time


 


No Known Allergies Allergy   Verified 03/20/15 01:47














ED Review of Systems


ROS: 


Stated complaint: ABD PAIN


Other details as noted in HPI





Constitutional: denies: fever


Eyes: denies: eye discharge


ENT: throat pain, congestion


Respiratory: cough


Cardiovascular: denies: syncope


Genitourinary: denies: dysuria


Musculoskeletal: back pain, arthralgia


Skin: denies: lesions


Psychiatric: anxiety.  denies: suicidal thoughts





ED Past Medical Hx





- Past Medical History


Hx Hypertension: Yes


Hx Heart Attack/AMI: No


Hx Congestive Heart Failure: No


Hx Diabetes: Yes


Hx Deep Vein Thrombosis: No


Hx GERD: Yes


Hx Liver Disease: No


Hx Asthma: No


Hx COPD: No





- Surgical History


Hx Coronary Stent: No


Hx Pacemaker: No


Hx Internal Defibrillator: No





- Social History


Smoking Status: Current Every Day Smoker


Substance Use Type: Alcohol





- Medications


Home Medications: 


                                Home Medications











 Medication  Instructions  Recorded  Confirmed  Last Taken  Type


 


Esomeprazole Magnesium [Nexium 20 mg PO DAILY 19 History





24Hr]     


 


metFORMIN [Glucophage] 500 mg PO BID 19 History


 


Lisinopril [Zestril] 5 mg PO QDAY 19 History


 


Multivitamin with Folic Acid [Cvs 400 mcg PO QDAY #30 tablet 19  Unknown 

Rx





One Daily Essential Tablet]     


 


chlordiazePOXIDE [Librium] 25 mg PO Q6H PRN #25 capsule 19  Unknown Rx














ED Physical Exam





- General


Limitations: No Limitations


General appearance: alert, in no apparent distress, anxious





- Head


Head exam: Present: atraumatic, normocephalic





- Eye


Eye exam: Present: normal appearance, EOMI.  Absent: nystagmus





- ENT


ENT exam: Present: normal orophraynx, mucous membranes moist, normal external 

ear exam





- Neck


Neck exam: Present: normal inspection, full ROM.  Absent: tenderness, 

meningismus





- Respiratory


Respiratory exam: Present: normal lung sounds bilaterally.  Absent: respiratory 

distress





- Cardiovascular


Cardiovascular Exam: Present: regular rate, normal rhythm, normal heart sounds. 

 Absent: bradycardia, tachycardia, irregular rhythm, systolic murmur, diastolic 

murmur, rubs, gallop





- GI/Abdominal


GI/Abdominal exam: Present: soft, normal bowel sounds.  Absent: distended, 

tenderness, guarding, rebound, rigid





- Rectal


Rectal exam: Present: deferred





- Extremities Exam


Extremities exam: Present: normal inspection, full ROM, other (2+ pulses noted 

in the bilateral upper, lower extremities.  Compartments soft.  No long bony 

tenderness.  The pelvis is stable.).  Absent: pedal edema, joint swelling, calf 

tenderness





- Back Exam


Back exam: Present: normal inspection, full ROM.  Absent: tenderness, CVA 

tenderness (R), CVA tenderness (L), paraspinal tenderness, vertebral tenderness





- Neurological Exam


Neurological exam: Present: alert, normal gait, other (Extraocular movements 

intact.  Tongue midline.  No facial droop.  Facial sensation intact to light 

touch in the V1, V2, V3 distribution bilaterally.  5 and 5 strength in 4 extre

mities..  Sensation is intact to light touch in 4 extremities.).  Absent: motor 

sensory deficit





- Psychiatric


Psychiatric exam: Present: anxious





- Skin


Skin exam: Present: warm, dry, intact, normal color.  Absent: rash





ED Course


                                   Vital Signs











  19





  13:34 13:43 14:17


 


Temperature 97.9 F 97.9 F 


 


Pulse Rate 100 H 100 H 


 


Respiratory 20 20 20





Rate   


 


Blood Pressure   


 


Blood Pressure  152/90 





[Left]   


 


O2 Sat by Pulse 100 100 100





Oximetry   














  19





  16:01 18:11 19:18


 


Temperature 97.1 F L 97.9 F 98.0 F


 


Pulse Rate 92 H 96 H 79


 


Respiratory 20 20 18





Rate   


 


Blood Pressure   


 


Blood Pressure 141/77 151/89 126/76





[Left]   


 


O2 Sat by Pulse 100 99 99





Oximetry   














  19





  20:14 03:28


 


Temperature  98.6 F


 


Pulse Rate 79 93 H


 


Respiratory  16





Rate  


 


Blood Pressure 126/76 


 


Blood Pressure  124/74





[Left]  


 


O2 Sat by Pulse  99





Oximetry  














- Reevaluation(s)


Reevaluation #1: 





19 16:32


Differential diagnosis, including not limited to: GERD, gastritis, hiatal 

hernia, pancreatitis, alcohol intoxication, urinary tract infection, renal colic





Assessment and plan: 60-year-old gentleman, multiple visits to this hospital for

 multiple complaints, intoxicated today clinically, no evidence of head trauma, 

found to have blood alcohol level of 0.33, remainder screening laboratory 

studies otherwise unremarkable.  Patient will be placed on ER hold.








As needed nonsedating medications for pain have been ordered.  A CT scan of the 

brain, abdomen  pelvis ordered.  We will need to await clinical sobriety.


Reevaluation #2: 





19 19:08


CT scan of the abdomen and pelvis is negative for acute disease.  Patient will 

be observed in the emergency room pending clinical sobriety, or until a sober 

and responsible adult can come by and pick him up.  Care will be transferred to 

the overnight physician, Dr. NIRU Cabral, to monitor patient for clinical sobriety 

and reassessment for suitability for discharge.





ED Medical Decision Making





- Lab Data


Result diagrams: 


                                 19 14:18





                                 19 14:18








                                   Vital Signs











  19





  13:34 13:43 14:17


 


Temperature 97.9 F 97.9 F 


 


Pulse Rate 100 H 100 H 


 


Respiratory 20 20 20





Rate   


 


Blood Pressure  152/90 





[Left]   


 


O2 Sat by Pulse 100 100 100





Oximetry   











                                   Lab Results











  19 Range/Units





  14:18 14:18 14:30 


 


WBC  6.4    (4.5-11.0)  K/mm3


 


RBC  4.26    (3.65-5.03)  M/mm3


 


Hgb  11.8    (11.8-15.2)  gm/dl


 


Hct  35.3 L    (35.5-45.6)  %


 


MCV  83 L    (84-94)  fl


 


MCH  28    (28-32)  pg


 


MCHC  33    (32-34)  %


 


RDW  15.2    (13.2-15.2)  %


 


Plt Count  237    (140-440)  K/mm3


 


Lymph % (Auto)  40.2 H    (13.4-35.0)  %


 


Mono % (Auto)  5.3    (0.0-7.3)  %


 


Eos % (Auto)  1.2    (0.0-4.3)  %


 


Baso % (Auto)  0.9    (0.0-1.8)  %


 


Lymph #  2.6    (1.2-5.4)  K/mm3


 


Mono #  0.3    (0.0-0.8)  K/mm3


 


Eos #  0.1    (0.0-0.4)  K/mm3


 


Baso #  0.1    (0.0-0.1)  K/mm3


 


Seg Neutrophils %  52.4    (40.0-70.0)  %


 


Seg Neutrophils #  3.4    (1.8-7.7)  K/mm3


 


Sodium   144   (137-145)  mmol/L


 


Potassium   3.9   (3.6-5.0)  mmol/L


 


Chloride   107.0   ()  mmol/L


 


Carbon Dioxide   23   (22-30)  mmol/L


 


Anion Gap   18   mmol/L


 


BUN   16   (9-20)  mg/dL


 


Creatinine   0.7 L   (0.8-1.5)  mg/dL


 


Estimated GFR   > 60   ml/min


 


BUN/Creatinine Ratio   23   %


 


Glucose   173 H   ()  mg/dL


 


Calcium   8.6   (8.4-10.2)  mg/dL


 


Urine Color    Colorless  (Yellow)  


 


Urine Turbidity    Clear  (Clear)  


 


Urine pH    7.0  (5.0-7.0)  


 


Ur Specific Gravity    1.004  (1.003-1.030)  


 


Urine Protein    <15 mg/dl  (Negative)  mg/dL


 


Urine Glucose (UA)    Neg  (Negative)  mg/dL


 


Urine Ketones    Neg  (Negative)  mg/dL


 


Urine Blood    Neg  (Negative)  


 


Urine Nitrite    Neg  (Negative)  


 


Urine Bilirubin    Neg  (Negative)  


 


Urine Urobilinogen    < 2.0  (<2.0)  mg/dL


 


Ur Leukocyte Esterase    Neg  (Negative)  


 


Urine WBC (Auto)    1.0  (0.0-6.0)  /HPF


 


Urine RBC (Auto)    1.0  (0.0-6.0)  /HPF


 


Urine Bacteria (Auto)    1+  (Negative)  /HPF











                                   Lab Results











  19 Range/Units





  14:18 14:18 14:30 


 


WBC  6.4    (4.5-11.0)  K/mm3


 


RBC  4.26    (3.65-5.03)  M/mm3


 


Hgb  11.8    (11.8-15.2)  gm/dl


 


Hct  35.3 L    (35.5-45.6)  %


 


MCV  83 L    (84-94)  fl


 


MCH  28    (28-32)  pg


 


MCHC  33    (32-34)  %


 


RDW  15.2    (13.2-15.2)  %


 


Plt Count  237    (140-440)  K/mm3


 


Lymph % (Auto)  40.2 H    (13.4-35.0)  %


 


Mono % (Auto)  5.3    (0.0-7.3)  %


 


Eos % (Auto)  1.2    (0.0-4.3)  %


 


Baso % (Auto)  0.9    (0.0-1.8)  %


 


Lymph #  2.6    (1.2-5.4)  K/mm3


 


Mono #  0.3    (0.0-0.8)  K/mm3


 


Eos #  0.1    (0.0-0.4)  K/mm3


 


Baso #  0.1    (0.0-0.1)  K/mm3


 


Seg Neutrophils %  52.4    (40.0-70.0)  %


 


Seg Neutrophils #  3.4    (1.8-7.7)  K/mm3


 


Sodium   144   (137-145)  mmol/L


 


Potassium   3.9   (3.6-5.0)  mmol/L


 


Chloride   107.0   ()  mmol/L


 


Carbon Dioxide   23   (22-30)  mmol/L


 


Anion Gap   18   mmol/L


 


BUN   16   (9-20)  mg/dL


 


Creatinine   0.7 L   (0.8-1.5)  mg/dL


 


Estimated GFR   > 60   ml/min


 


BUN/Creatinine Ratio   23   %


 


Glucose   173 H   ()  mg/dL


 


Calcium   8.6   (8.4-10.2)  mg/dL


 


Magnesium     (1.7-2.3)  mg/dL


 


Total Creatine Kinase     ()  units/L


 


Lipase     (13-60)  units/L


 


Urine Color    Colorless  (Yellow)  


 


Urine Turbidity    Clear  (Clear)  


 


Urine pH    7.0  (5.0-7.0)  


 


Ur Specific Gravity    1.004  (1.003-1.030)  


 


Urine Protein    <15 mg/dl  (Negative)  mg/dL


 


Urine Glucose (UA)    Neg  (Negative)  mg/dL


 


Urine Ketones    Neg  (Negative)  mg/dL


 


Urine Blood    Neg  (Negative)  


 


Urine Nitrite    Neg  (Negative)  


 


Urine Bilirubin    Neg  (Negative)  


 


Urine Urobilinogen    < 2.0  (<2.0)  mg/dL


 


Ur Leukocyte Esterase    Neg  (Negative)  


 


Urine WBC (Auto)    1.0  (0.0-6.0)  /HPF


 


Urine RBC (Auto)    1.0  (0.0-6.0)  /HPF


 


Urine Bacteria (Auto)    1+  (Negative)  /HPF


 


Salicylates     (2.8-20.0)  mg/dL


 


Acetaminophen     (10.0-30.0)  ug/mL


 


Plasma/Serum Alcohol     (0-0.07)  %














  19 Range/Units





  15:15 15:15 15:15 


 


WBC     (4.5-11.0)  K/mm3


 


RBC     (3.65-5.03)  M/mm3


 


Hgb     (11.8-15.2)  gm/dl


 


Hct     (35.5-45.6)  %


 


MCV     (84-94)  fl


 


MCH     (28-32)  pg


 


MCHC     (32-34)  %


 


RDW     (13.2-15.2)  %


 


Plt Count     (140-440)  K/mm3


 


Lymph % (Auto)     (13.4-35.0)  %


 


Mono % (Auto)     (0.0-7.3)  %


 


Eos % (Auto)     (0.0-4.3)  %


 


Baso % (Auto)     (0.0-1.8)  %


 


Lymph #     (1.2-5.4)  K/mm3


 


Mono #     (0.0-0.8)  K/mm3


 


Eos #     (0.0-0.4)  K/mm3


 


Baso #     (0.0-0.1)  K/mm3


 


Seg Neutrophils %     (40.0-70.0)  %


 


Seg Neutrophils #     (1.8-7.7)  K/mm3


 


Sodium     (137-145)  mmol/L


 


Potassium     (3.6-5.0)  mmol/L


 


Chloride     ()  mmol/L


 


Carbon Dioxide     (22-30)  mmol/L


 


Anion Gap     mmol/L


 


BUN     (9-20)  mg/dL


 


Creatinine     (0.8-1.5)  mg/dL


 


Estimated GFR     ml/min


 


BUN/Creatinine Ratio     %


 


Glucose     ()  mg/dL


 


Calcium     (8.4-10.2)  mg/dL


 


Magnesium  1.90    (1.7-2.3)  mg/dL


 


Total Creatine Kinase  212 H    ()  units/L


 


Lipase  19    (13-60)  units/L


 


Urine Color     (Yellow)  


 


Urine Turbidity     (Clear)  


 


Urine pH     (5.0-7.0)  


 


Ur Specific Gravity     (1.003-1.030)  


 


Urine Protein     (Negative)  mg/dL


 


Urine Glucose (UA)     (Negative)  mg/dL


 


Urine Ketones     (Negative)  mg/dL


 


Urine Blood     (Negative)  


 


Urine Nitrite     (Negative)  


 


Urine Bilirubin     (Negative)  


 


Urine Urobilinogen     (<2.0)  mg/dL


 


Ur Leukocyte Esterase     (Negative)  


 


Urine WBC (Auto)     (0.0-6.0)  /HPF


 


Urine RBC (Auto)     (0.0-6.0)  /HPF


 


Urine Bacteria (Auto)     (Negative)  /HPF


 


Salicylates   < 0.3 L   (2.8-20.0)  mg/dL


 


Acetaminophen    < 5.0 L  (10.0-30.0)  ug/mL


 


Plasma/Serum Alcohol     (0-0.07)  %














  19 Range/Units





  15:15 


 


WBC   (4.5-11.0)  K/mm3


 


RBC   (3.65-5.03)  M/mm3


 


Hgb   (11.8-15.2)  gm/dl


 


Hct   (35.5-45.6)  %


 


MCV   (84-94)  fl


 


MCH   (28-32)  pg


 


MCHC   (32-34)  %


 


RDW   (13.2-15.2)  %


 


Plt Count   (140-440)  K/mm3


 


Lymph % (Auto)   (13.4-35.0)  %


 


Mono % (Auto)   (0.0-7.3)  %


 


Eos % (Auto)   (0.0-4.3)  %


 


Baso % (Auto)   (0.0-1.8)  %


 


Lymph #   (1.2-5.4)  K/mm3


 


Mono #   (0.0-0.8)  K/mm3


 


Eos #   (0.0-0.4)  K/mm3


 


Baso #   (0.0-0.1)  K/mm3


 


Seg Neutrophils %   (40.0-70.0)  %


 


Seg Neutrophils #   (1.8-7.7)  K/mm3


 


Sodium   (137-145)  mmol/L


 


Potassium   (3.6-5.0)  mmol/L


 


Chloride   ()  mmol/L


 


Carbon Dioxide   (22-30)  mmol/L


 


Anion Gap   mmol/L


 


BUN   (9-20)  mg/dL


 


Creatinine   (0.8-1.5)  mg/dL


 


Estimated GFR   ml/min


 


BUN/Creatinine Ratio   %


 


Glucose   ()  mg/dL


 


Calcium   (8.4-10.2)  mg/dL


 


Magnesium   (1.7-2.3)  mg/dL


 


Total Creatine Kinase   ()  units/L


 


Lipase   (13-60)  units/L


 


Urine Color   (Yellow)  


 


Urine Turbidity   (Clear)  


 


Urine pH   (5.0-7.0)  


 


Ur Specific Gravity   (1.003-1.030)  


 


Urine Protein   (Negative)  mg/dL


 


Urine Glucose (UA)   (Negative)  mg/dL


 


Urine Ketones   (Negative)  mg/dL


 


Urine Blood   (Negative)  


 


Urine Nitrite   (Negative)  


 


Urine Bilirubin   (Negative)  


 


Urine Urobilinogen   (<2.0)  mg/dL


 


Ur Leukocyte Esterase   (Negative)  


 


Urine WBC (Auto)   (0.0-6.0)  /HPF


 


Urine RBC (Auto)   (0.0-6.0)  /HPF


 


Urine Bacteria (Auto)   (Negative)  /HPF


 


Salicylates   (2.8-20.0)  mg/dL


 


Acetaminophen   (10.0-30.0)  ug/mL


 


Plasma/Serum Alcohol  0.33 H  (0-0.07)  %














- EKG Data


-: EKG Interpreted by Me


EKG shows normal: sinus rhythm


Rate: normal





- EKG Data





19 16:31


This is a sinus rhythm, 93 bpm, normal axis,  ms, left ventricular 

hypertrophy, poor R progression, the EKG is abnormal, the EKG is not consistent 

with ST elevation myocardial infarction, the EKG is unchanged from prior EKG











- Radiology Data


Radiology results: report reviewed, image reviewed











Referring Physician: ELAINE DUBOIS


 


Patient Name: JANAK NESS


 


Patient ID: [id number]


 


YOB: 1959


 


Sex: Male


 


Accession: [id number]


 


Report Date: 2019


 


Report Status: Finalized








[place] [address] Cat

 Scan Report Signed Patient: JANAK NESS MR#: [id number] : 1959 

Acct:[id number] Age/Sex: 60 / M ADM Date: 19 Loc: ED Attending Dr: 

Ordering Physician: ELAINE DUBOIS DO Date of Service: 19 Procedure(s): CT

 abdomen pelvis wo con Accession Number(s): [id number] cc: ELAINE DUBOIS DO CT 

ABDOMEN AND PELVIS WITHOUT CONTRAST INDICATION / CLINICAL INFORMATION: fall, 

left flank pain. TECHNIQUE: Axial CT images were obtained through the abdomen 

and pelvis without IV contrast. All CT scans at this location are performed usin

g CT dose reduction for ALARA by means of automated exposure control. 

COMPARISON: CT scan dated 2019 FINDINGS: LOWER CHEST: Scarring is noted in 

the left lung base. There are calcified pleural plaques in the left base. LIVER:

 Unchanged GALLBLADDER: No significant abnormality. BILE DUCTS: No significant 

abnormality. PANCREAS: No significant abnormality. SPLEEN: No significant 

abnormality. ADRENALS: No significant abnormality. RIGHT KIDNEY and URETER: No 

significant abnormality. LEFT KIDNEY and URETER: No significant abnormality. 

STOMACH and SMALL BOWEL: No significant abnormality. COLON: Moderate amount 

stool is noted in the colon. APPENDIX: No significant abnormality. PERITONEUM: 

No free fluid. No free air. No fluid collection. LYMPH NODES: No significant 

adenopathy. AORTA and ARTERIES: Atherosclerotic calcifications are noted in the 

aorta and iliac arteries. The aorta is normal in diameter. IVC and VEINS: No 

significant abnormality. URINARY BLADDER: No significant abnormality. REP

RODUCTIVE ORGANS: No significant abnormality. ADDITIONAL FINDINGS: None. 

SKELETAL SYSTEM: No acute osseous abnormalities are seen. IMPRESSION: 1. There 

is no obstruction, inflammation, or free air. No acute abnormality is seen in 

the abdomen or pelvis. No intra-abdominal organ injury is identified. Signer 

Name: Dick Guzman MD Signed: 2019 4:22 PM Workstation Name: [id number] 

Transcribed By:  Dictated By: Dick Guzman MD Electronically 

Authenticated By: Dick Guzman MD Signed Date/Time: 19 1622 








[facility] [address] [place] [address] Cat

 Scan Report Signed Patient: JANAK NESS MR#: [id number] : 1959 

Acct:[id number] Age/Sex: 60 / M ADM Date: 19 Loc: ED Attending Dr: 

Ordering Physician: PATRICIA SY MD Date of Service: 19 

Procedure(s): CT abdomen pelvis w con Accession Number(s): [id number] cc: PATRICIA SY MD CT chest w con, CT abdomen pelvis w con INDICATION / CLINICAL 

INFORMATION: Chest Pain, abdomen and pelvic pain, elevated liver function tests.

 TECHNIQUE: 100 cc of Omnipaque 350 was administered intravenously All CT scans 

at this location are performed using CT dose reduction for ALARA by means of 

automated exposure control. COMPARISON: None available. FINDINGS: Diffuse 

airspace disease is seen in the left apex there are a few bulla seen lower in 

the left lung. No other significant parenchymal abnormality is identified. No 

enlarged mediastinal or hilar lymph nodes are identified. No significant 

skeletal abnormality is seen in the thorax. In the abdomen, no free fluid is 

seen. There are some fluid-filled loops of small bowel present. Diffuse fatty 

infiltration is present in the liver without focal abnormality. The spleen, 

kidneys, pancreas and adrenal glands are normal. No enlarged retroperitoneal 

lymph nodes are seen. Diffuse atherosclerotic changes present without evidence 

of an aneurysm. In the pelvis, no free fluid is seen. No enlarged lymph nodes 

are identified. The bladder and the appendix are normal. No significant skeletal

 abnormality is identified. IMPRESSION: 1. Airspace disease in the left apex. 

There are a few bulla present more inferiorly in the left lung 2. Fluid-filled 

loops of small bowel could represent mild enteritis 3. Diffuse fatty 

infiltration in the liver without focal abnormality 4. Atherosclerotic change 

without evidence of an aneurysm Signer Name: Chucho Gonzalez MD FACR Signed: 

2019 4:31 AM Workstation Name: [id number] Transcribed By: MS Dictated By: 

Chucho Gonzalez MD Electronically Authenticated By: Chucho Gonzalez MD 

Signed Date/Time: 19 0431 








Print Report











Referring Physician: MARTINE JARRELL 


 


Patient Name: JANAK NESS 


 


Patient ID: [id number] 


 


YOB: 1959 


 


Sex: Male 


 


Accession: [id number] 


 


Report Date: 2019 


 


Report Status: Finalized 


 


Findings


[address] [address] 

XRay Report Signed Patient: JANAK NESS MR#: [id number] : 1959 

Acct:[id number] Age/Sex: 60 / M ADM Date: 19 Loc: ED Attending Dr: 

Ordering Physician: MARTINE JARRELL MD Date of Service: 19 Procedure(s):

 XR chest 1V ap Accession Number(s): [id number] cc: MARTINE JARRELL MD Fluoro T

amee In Minutes: CHEST 1 VIEW 3:28 PM INDICATION / CLINICAL INFORMATION: Cough 

and back pain. Left abdominal pain. COMPARISON: 2019. FINDINGS: SUPPORT 

DEVICES: None. HEART / MEDIASTINUM: The heart size and pulmonary vasculature are

 normal. LUNGS / PLEURA: There is localized pleuroparenchymal disease in the 

left upper hemithorax, mildly improved since the prior exam. The right lung is 

clear. No pneumothorax. ADDITIONAL FINDINGS: No significant additional findings.

 IMPRESSION: Improving pleuroparenchymal disease in the left upper hemithorax. 

Signer Name: Arturo Ferreira MD Signed: 2019 4:03 PM Workstation Name: 

[id number] Transcribed By: RT Dictated By: Arturo Ferreira MD Electronically 

Authenticated By: Arturo Ferreira MD Signed Date/Time: 19 6287   














BRAIN / INTRACRANIAL CONTENTS: No acute hemorrhage, mass effect, midline shift, 

or hydrocephalus. No appreciable acute large territorial or lacunar infarct. 

Chronic appearing lacunar infarct is seen in the right caudate head. There is 

mild age commensurate ventricular and cisternal prominence without discrete 

focal brain atrophy. ORBITS: No significant abnormality of visualized orbits. 

SINUSES / MASTOIDS: No significant abnormality of visualized sinuses and mastoid

 air cells. ADDITIONAL FINDINGS: None. IMPRESSION: 1. No acute intracranial 

abnormality. 2. Chronic appearing small lacunar infarct in the right caudate 

nucleus head. Signer Name: Khoi Thurman MD Signed: 2019 5:59 PM Workstation

 Name: [id number] Transcribed By: JC Dictated By: Khoi Thurman MD 

Electronically Authenticated By: Khoi Thurman MD Signed Date/Time: 19 

1925 


Critical care attestation.: 


If time is entered above; I have spent that time in minutes in the direct care 

of this critically ill patient, excluding procedure time.








ED Disposition


Clinical Impression: 


 Alcohol intoxication, Flank pain





Disposition: DC-01 TO HOME OR SELFCARE


Is pt being admited?: No


Does the pt Need Aspirin: No


Condition: Stable


Additional Instructions: 


Recommend patient discontinue or minimize alcohol consumption.  Avoid 

consumption of Motrin, ibuprofen, Naprosyn, Aleve, acetaminophen, Tylenol.  

Patient may take over-the-counter antacid medication such as Pepcid, Protonix, 

Maalox as needed for sensation of pain.  Rest, avoid heavy lifting, and avoid 

strenuous physical activity.  Return to the emergency room right away with Bach,

 worsened or different symptoms, or symptoms not present on the initial emergen

cy room evaluation.





Recommend follow-up with an outpatient primary care doctor within the next 7-10 

days.








If patient is motivated to discontinue alcohol consumption, he may take the 

Librium medication as needed for sensation of alcohol withdrawal, he should 

follow up with a primary care doctor, psychiatrist, for mental health department

 for further evaluation for possible detox.





Prescriptions: 


Multivitamin with Folic Acid [[facility] One Daily Essential Tablet] 400 mcg PO QDAY 

#30 tablet


chlordiazePOXIDE [Librium] 25 mg PO Q6H PRN #25 capsule


 PRN Reason: Alcohol Withdrawal


Referrals: 


[facility] [Primary Care Provider] - 3-5 Days


[facility] [Outside] - 3-5 Days

## 2019-08-28 NOTE — CAT SCAN REPORT
CT abdomen pelvis wo con 



INDICATION / CLINICAL INFORMATION:

abd pain.



TECHNIQUE:

All CT scans at this location are performed using CT dose reduction for ALARA by means of automated e
xposure control. 



COMPARISON:

8/23/2019



FINDINGS:

Limited lower thoracic images show no acute disease.



ABDOMEN:

The gallbladder, liver, spleen, pancreas and kidneys are normal.

No mesenteric or retroperitoneal adenopathy.

Adrenal glands are normal bilaterally.



No small bowel abnormality.



Pelvis:

The appendix is normal.

No inflammatory changes or dependent fluid collections are seen in the pelvis.



No skeletal abnormality.



IMPRESSION:

1. No acute abnormality. 



Signer Name: Jonathan Rios MD 

Signed: 8/28/2019 6:48 PM

 Workstation Name: [id number]

## 2019-08-28 NOTE — XRAY REPORT
CHEST 1 VIEW 3:28 PM



INDICATION / CLINICAL INFORMATION:

Cough and back pain. Left abdominal pain.



COMPARISON: 

8/1/2019.



FINDINGS:



SUPPORT DEVICES: None.

HEART / MEDIASTINUM: The heart size and pulmonary vasculature are normal. 

LUNGS / PLEURA: There is localized pleuroparenchymal disease in the left upper hemithorax, mildly imp
roved since the prior exam. The right lung is clear. No pneumothorax. 

ADDITIONAL FINDINGS: No significant additional findings.



IMPRESSION: Improving pleuroparenchymal disease in the left upper hemithorax.



Signer Name: Arturo Ferreira MD 

Signed: 8/28/2019 4:03 PM

 Workstation Name: [id number]

## 2019-08-28 NOTE — CAT SCAN REPORT
CT head/brain wo con



INDICATION:

Possible head trauma, alcohol intoxication.



TECHNIQUE: Routine CT head without contrast. All CT scans at this location are performed using CT dos
e reduction for ALARA by means of automated exposure control.



COMPARISON: 

None.



FINDINGS:



BRAIN / INTRACRANIAL CONTENTS: No acute hemorrhage, mass effect, midline shift, or hydrocephalus. No 
appreciable acute large territorial or lacunar infarct. Chronic appearing lacunar infarct is seen in 
the right caudate head. There is mild age commensurate ventricular and cisternal prominence without d
iscrete focal brain atrophy.



ORBITS: No significant abnormality of visualized orbits.

SINUSES / MASTOIDS: No significant abnormality of visualized sinuses and mastoid air cells.



ADDITIONAL FINDINGS: None. 



IMPRESSION:

1. No acute intracranial abnormality.

2. Chronic appearing small lacunar infarct in the right caudate nucleus head. 



Signer Name: Khoi Thurman MD 

Signed: 8/28/2019 5:59 PM

 Workstation Name: [id number]

## 2019-10-22 NOTE — EMERGENCY DEPARTMENT REPORT
ED General Adult HPI





- General


Chief complaint: Abdominal Pain


Stated complaint: ABD PAIN/CHEST PAIN


Time Seen by Provider: 10/22/19 14:59


Source: patient, EMS ( EMS documentation not available at time of chart 

dictation ), RN notes reviewed, old records reviewed


Mode of arrival: Stretcher


Limitations: No Limitations, Other (the patient is a poor historian)





- History of Present Illness


Initial comments: 





This is a 60-year-old gentleman.  I have evaluated this patient in the past.





Past medical history includes type 2 diabetes, hypertension, tobacco use, 

alcohol dependency, chronic pleural disease.  Patient was admitted to this 

hospital over the summer.  Had extensive workup, including CT scan chest, CT 

scan abdomen pelvis, and nuclear cardiac stress test.  The last, saw him, he 

presented intoxicated.  Today, he presents to the ER with a complaint of 

nontraumatic abdominal pain.  The abdominal pain is epigastric.  He indicates 

that it started this morning.  He indicates that it is sharp and throbbing.  He 

indicates it does not radiate anywhere.  He does not complain of chest pain.  He

has chronic shortness of breath.  He denies DVT, and pulmonary embolism risk 

factors.  He endorses initial clear and white emesis, followed by pink emesis.  

He denies black stool, bright red blood per rectum.  He denies testicular pain 

and urinary symptoms.  He makes no endorsement of homicidality or suicidality.  

He is asking for pain medicine.  He reports that he consumed alcohol yesterday, 

but not today.


-: Gradual


Location: abdomen


Radiation: non-radiation


Severity scale (0 -10): 10


Quality: other


Consistency: other


Improves with: other


Worsens with: other





- Related Data


                                Home Medications











 Medication  Instructions  Recorded  Confirmed  Last Taken


 


Lisinopril [Zestril] 5 mg PO QDAY 19








                                  Previous Rx's











 Medication  Instructions  Recorded  Last Taken  Type


 


Amoxicillin/Potassium Clav 1 each PO BID #20 tablet 10/22/19 Unknown Rx





[Augmentin 875-125 Tablet]    


 


Esomeprazole Magnesium [Nexium 20 mg PO DAILY #30 tab 10/22/19 Unknown Rx





24Hr]    


 


Metoclopramide [Reglan] 10 mg PO QID PRN #30 tab 10/22/19 Unknown Rx


 


Multivitamin with Folic Acid [Cvs 400 mcg PO QDAY #30 tablet 10/22/19 Unknown Rx





One Daily Essential Tablet]    


 


chlordiazePOXIDE [Librium] 25 mg PO Q6H PRN #25 capsule 10/22/19 Unknown Rx











                                    Allergies











Allergy/AdvReac Type Severity Reaction Status Date / Time


 


No Known Allergies Allergy   Verified 03/20/15 01:47














ED Review of Systems


ROS: 


Stated complaint: ABD PAIN/CHEST PAIN


Other details as noted in HPI





Constitutional: denies: fever


Eyes: denies: eye discharge


ENT: denies: congestion


Respiratory: denies: wheezing


Cardiovascular: denies: chest pain


Gastrointestinal: abdominal pain, nausea, vomiting


Genitourinary: denies: urgency, dysuria


Musculoskeletal: myalgia


Neurological: denies: as per HPI, weakness


Hematological/Lymphatic: denies: easy bleeding





ED Past Medical Hx





- Past Medical History


Previous Medical History?: Yes


Hx Hypertension: Yes


Hx Heart Attack/AMI: No


Hx Congestive Heart Failure: No


Hx Diabetes: Yes


Hx Deep Vein Thrombosis: No


Hx GERD: Yes


Hx Liver Disease: No


Hx Asthma: No


Hx COPD: No


Additional medical history: ETOH





- Surgical History


Hx Coronary Stent: No


Hx Pacemaker: No


Hx Internal Defibrillator: No





- Social History


Smoking Status: Current Every Day Smoker


Substance Use Type: Alcohol





- Medications


Home Medications: 


                                Home Medications











 Medication  Instructions  Recorded  Confirmed  Last Taken  Type


 


Lisinopril [Zestril] 5 mg PO QDAY 19 History


 


Amoxicillin/Potassium Clav 1 each PO BID #20 tablet 10/22/19  Unknown Rx





[Augmentin 875-125 Tablet]     


 


Esomeprazole Magnesium [Nexium 20 mg PO DAILY #30 tab 10/22/19  Unknown Rx





24Hr]     


 


Metoclopramide [Reglan] 10 mg PO QID PRN #30 tab 10/22/19  Unknown Rx


 


Multivitamin with Folic Acid [Cvs 400 mcg PO QDAY #30 tablet 10/22/19  Unknown 

Rx





One Daily Essential Tablet]     


 


chlordiazePOXIDE [Librium] 25 mg PO Q6H PRN #25 capsule 10/22/19  Unknown Rx














ED Physical Exam





- General


Limitations: No Limitations


General appearance: alert, appears intoxicated





- Head


Head exam: Present: atraumatic, normocephalic





- Eye


Eye exam: Present: normal appearance, EOMI





- ENT


ENT exam: Present: normal exam, normal orophraynx, mucous membranes moist, 

normal external ear exam





- Neck


Neck exam: Present: normal inspection, full ROM.  Absent: tenderness, 

meningismus





- Respiratory


Respiratory exam: Present: decreased breath sounds.  Absent: respiratory 

distress





- Cardiovascular


Cardiovascular Exam: Present: normal rhythm, tachycardia, normal heart sounds.  

Absent: systolic murmur, diastolic murmur, rubs, gallop





- GI/Abdominal


GI/Abdominal exam: Present: soft, tenderness, other (there is epigastric 

tenderness, there is no rebound, guarding or peritoneal sign).  Absent: 

distended, guarding, rebound, rigid, pulsatile mass





- Rectal


Rectal exam: Present: deferred





- Extremities Exam


Extremities exam: Present: normal inspection, full ROM, other (2+ pulses noted 

in the bilateral upper, lower extremities.  There is no long bone tenderness.  

Musculoskeletal compartments are soft.  The pelvis is stable.).  Absent: pedal 

edema, calf tenderness





- Back Exam


Back exam: Present: normal inspection, full ROM.  Absent: tenderness, CVA 

tenderness (R), CVA tenderness (L), paraspinal tenderness, vertebral tenderness





- Neurological Exam


Neurological exam: Present: alert, other (there is no facial droop.  The tongue 

is midline.  Extraocular movements are intact bilaterally.  Patient speaking in 

full complete sentences.  Shoulder shrug is intact bilaterally.  Hearing is gr

ossly intact bilaterally.  Visual acuity intact to finger counting and color 

perception at a close distance.  5/5 strength 4 extremities.  Sensation intact 

to light touch in 4 extremities.)





- Psychiatric


Psychiatric exam: Present: anxious





- Skin


Skin exam: Present: warm, dry, intact, normal color.  Absent: rash





ED Course


                                   Vital Signs











  10/22/19 10/22/19 10/22/19





  14:57 15:58 18:26


 


Temperature 98.5 F  


 


Pulse Rate 102 H 93 H 98 H


 


Respiratory 18 17 18





Rate   


 


Blood Pressure 148/88 131/81 136/82





[Right]   


 


O2 Sat by Pulse 99 93 99





Oximetry   














  10/22/19 10/23/19 10/23/19





  21:00 01:00 03:25


 


Temperature 98.0 F  97.9 F


 


Pulse Rate 97 H 87 97 H


 


Respiratory 17 16 17





Rate   


 


Blood Pressure 152/88 155/78 156/89





[Right]   


 


O2 Sat by Pulse 100 100 100





Oximetry   














- Reevaluation(s)


Reevaluation #1: 





10/22/19 15:48


Differential diagnosis, including not limited to: Pancreatitis, GERD, gastritis,

 alcohol intoxication, biliary disease





Assessment and plan: 60-year-old gentleman with history of alcohol dependence, 

likely intoxicated, presenting with recurrent abdominal pain, suspect alcohol 

related complication, either pancreatitis, biliary disease, or alcohol induced 

gastritis.  The patient is afebrile with reassuring vital signs, and his 

tachycardia has resolved at this time.  He is pleasant, calm and cooperative.  

Does not meet 1013 criteria at this time.  We will treat his symptoms with 

nonsedating medications, obtain screening laboratory studies, and then CT scan 

of the abdomen pelvis.  The test would not be ordered and toes laboratory 

studies results, to determine patient's suitability for IV contrast.


Reevaluation #2: 





10/22/19 18:49


Patient in no acute distress.  Care team informed me that the patient ate a 

large meal.


Reevaluation #3: 





10/22/19 21:05


No active vomiting.  Patient obviously able to tolerate oral feeds.  CT scan sug

gests nonspecific possible right-sided ascending colitis.  X-ray of chest 

suggest possible pneumonia.  Patient not significantly hypoxic.  He appears 

quite comfortable.  Patient suitable for trial of oral outpatient antibiotic 

management, once clinically sober.


Reevaluation #4: 





10/22/19 23:53


Patient tolerating liquid feeds.  His blood alcohol level is still somewhat nan

vated.  Plan to discharge the patient when clinically sober.  Care will be 

transferred to the overnight physician, Dr. IRIS Negron to d/c when clinically sober





ED Medical Decision Making





- Lab Data


Result diagrams: 


                                 10/22/19 15:22





                                 10/22/19 15:22








                                   Vital Signs











  10/22/19





  14:57


 


Temperature 98.5 F


 


Pulse Rate 102 H


 


Respiratory 18





Rate 


 


Blood Pressure 148/88





[Right] 


 


O2 Sat by Pulse 99





Oximetry 














- EKG Data


-: EKG Interpreted by Me





- EKG Data





10/22/19 15:49


EKG today is unchanged from prior EKG.  This is a sinus rhythm, with 97 bpm, 

normal axis, QTC is 471 ms, there is borderline left ventricular hypertrophy, 

the EKG is abnormal, the EKG is unchanged from prior.  The EKG is not consistent

 with ST elevation myocardial infarction, and its unchanged from prior.





- Radiology Data


Radiology results: pending, report reviewed, image reviewed





Print Report











Referring Physician: MARTINE JARRELL 


 


Patient Name: JANAK NESS 


 


Patient ID: [id number] 


 


YOB: 1959 


 


Sex: Male 


 


Accession: [id number] 


 


Report Date: 2019-10-22 


 


Report Status: Finalized 


 


Findings


[facility] [address] [place] [address] Cat

 Scan Report Signed Patient: JANAK NESS MR#: [id number] : 1959 

Acct:[id number] Age/Sex: 60 / M ADM Date: 10/22/19 Loc: ED Attending Dr: 

Ordering Physician: MARTINE JARRELL MD Date of Service: 10/22/19 Procedure(s):

 CT abdomen pelvis w con Accession Number(s): [id number] cc: MARTINE JARRELL MD 

CT scan of the abdomen and pelvis with contrast INDICATION: Abdominal pain, 

tenderness. TECHNIQUE: All CT scans at this location are performed using the 

following dose modulation technique: Automated exposure control. Helical slices 

were obtained through the abdomen and pelvis. 100 cc of Omnipaque 300 is 

administered. COMPARISON: CT scan dated 2019 FINDINGS: Abdomen: There is 

scar noted in the left lung base. There is fatty infiltration of the liver. The 

spleen, pancreas, adrenal glands, kidneys, and small bowel unremarkable. The 

aorta is normal in diameter. Atherosclerotic calcifications are noted in the 

aorta and iliac arteries. The appendix is unremarkable. There is wall thickening

 noted in the right colon to the level of the hepatic flexure. Finding when 

compared to the prior study. Pelvis: The bowel contained within the pelvis is 

unremarkable. There is no laboratory change. The urinary bladder is grossly 

unremarkable. On review of bone windows, no acute osseous abnormalities are 

seen. IMPRESSION: There is wall thickening noted in the right colon and hepatic 

flexure possibly representing colitis. There is no obstruction. There are no 

abnormal fluid collections and there is no free air. There is fatty infiltration

 of the liver. Signer Name: Dick Guzman MD Signed: 10/22/2019 8:14 PM 

Workstation Name: [id number] Transcribed By: SS Dictated By: Dick Guzman MD Electronically Authenticated By: Dick Guzman MD Signed Date/Time: 

10/22/19 2014 DD/DT: 10/22/19 2008 TD/TT:   











Print Report











Referring Physician: MARTINE JARRELL 


 


Patient Name: JANAK NESS 


 


Patient ID: [id number] 


 


YOB: 1959 


 


Sex: Male 


 


Accession: [id number] 


 


Report Date: 2019-10-22 


 


Report Status: Finalized 


 


Findings


[facility] [address] [place] [address] 

XRay Report Signed Patient: JANAK NESS MR#: [id number] : 1959 

Acct:[id number] Age/Sex: 60 / M ADM Date: 10/22/19 Loc: ED Attending Dr: 

Ordering Physician: MARTINE JARRELL MD Date of Service: 10/22/19 Procedure(s):

 XR chest 1V ap Accession Number(s): [id number] cc: MARTINE JARRELL MD Fluoro 

Time In Minutes: CHEST 1 VIEW INDICATION / CLINICAL INFORMATION: cough hx of 

sob. COMPARISON: 2019 FINDINGS: SUPPORT DEVICES: None. HEART / MEDIASTINUM:

 No significant abnormality. LUNGS / PLEURA: Airspace disease and pleural 

thickening in the left apex and upper lobe has worsened No pneumothorax. 

ADDITIONAL FINDINGS: No significant additional findings. IMPRESSION: Airspace 

disease and pleural thickening in the left apex and upper lobe has worsened 

since prior examination dated 2019 Signer Name: Chucho Gonzalez MD FACR 

Signed: 10/22/2019 3:53 PM Workstation Name: [id number] Transcribed By: MS 

Dictated By: Chucho Gonzalez MD Electronically Authenticated By: Chucho Gonzalez MD Signed Date/Time: 10/22/19 1553 DD/DT: 10/22/19 1551 TD/TT:   














Critical care attestation.: 


If time is entered above; I have spent that time in minutes in the direct care 

of this critically ill patient, excluding procedure time.








ED Disposition


Clinical Impression: 


 Alcohol intoxication, Abdominal pain





Disposition: DC-01 TO HOME OR SELFCARE


Is pt being admited?: No


Does the pt Need Aspirin: No


Condition: Stable


Additional Instructions: 


Take the antibiotics as directed.  Patient may take over-the-counter pain 

medication as needed, either Pepcid, 20 mg, once every 12-24 hours, as needed, 

alternating with Motrin, 400 mg, with food, every 6 hours, alternating with 

Tylenol, 650 mg, with food, as needed for pain.  Recommend discontinuation of 

alcohol consumption.  Long-term consumption of alcohol may result an addiction, 

disability, paralysis, loss of quality of life.  Take the nausea medication is 

needed and directed.  Take antibiotics as needed and directed.  Recommend foll

ow-up with the primary care doctor within the next month.  Patient should also 

follow up with a gastroenterologist for nonspecific large intestine findings on 

CT scan.  Not following up is recommended may result an undiagnosed cancer, 

tumor, malignancy.





Do not take metformin medication for the next 2 days, if patient takes this 

medication.





Return to the emergency room right away with new, worsened, different symptoms, 

or symptoms not present on the initial emergency room evaluation.


Prescriptions: 


Amoxicillin/Potassium Clav [Augmentin 875-125 Tablet] 1 each PO BID #20 tablet


Multivitamin with Folic Acid [Cvs One Daily Essential Tablet] 400 mcg PO QDAY 

#30 tablet


chlordiazePOXIDE [Librium] 25 mg PO Q6H PRN #25 capsule


 PRN Reason: Alcohol Withdrawal


Esomeprazole Magnesium [Nexium 24Hr] 20 mg PO DAILY #30 tab


Metoclopramide [Reglan] 10 mg PO QID PRN #30 tab


 PRN Reason: Nausea


Referrals: 


[facility] [Provider Group] - 3-5 Days

## 2019-10-22 NOTE — CAT SCAN REPORT
CT scan of the abdomen and pelvis with contrast



INDICATION:

Abdominal pain, tenderness.



TECHNIQUE:

All CT scans at this location are performed using the following dose modulation technique: Automated 
exposure control. Helical slices were obtained through the abdomen and pelvis. 100 cc of Omnipaque 30
0 is administered. 



COMPARISON:

CT scan dated 8/28/2019



FINDINGS:

Abdomen: There is scar noted in the left lung base. There is fatty infiltration of the liver. The spl
een, pancreas, adrenal glands, kidneys, and small bowel unremarkable. The aorta is normal in diameter
. Atherosclerotic calcifications are noted in the aorta and iliac arteries. The appendix is unremarka
ble.



There is wall thickening noted in the right colon to the level of the hepatic flexure. Finding when c
ompared to the prior study.



Pelvis: The bowel contained within the pelvis is unremarkable. There is no laboratory change. The uri
nary bladder is grossly unremarkable.



On review of bone windows, no acute osseous abnormalities are seen.







IMPRESSION:

 There is wall thickening noted in the right colon and hepatic flexure possibly representing colitis.




There is no obstruction. There are no abnormal fluid collections and there is no free air.



There is fatty infiltration of the liver.



Signer Name: Dick Guzman MD 

Signed: 10/22/2019 8:14 PM

 Workstation Name: [id number]

## 2019-12-20 NOTE — EVENT NOTE
ED Screening Note


Date of service: 12/20/19


Time: 17:26


ED Screening Note: 





This is a 60 y.o. M. that presents to the ER with chest pain for 7 hours.





Patient reports feeling dizzy this morning.





Current smoker and ETOH





PMH of DM and HTN





This initial assessment/diagnostic orders/clinical plan/treatment(s) is/are 

subject to change based on patients health status, clinical progression and re-

assessment by fellow clinical providers in the ED. Further treatment and workup 

at subsequent clinical providers discretion. Patient/guardian urged not to elope

from the ED as their condition may be serious if not clinically assessed and 

managed. 





Initial orders include: 





Labs, EKG, & CXR

## 2019-12-20 NOTE — EMERGENCY DEPARTMENT REPORT
ED General Adult HPI





- General


Chief complaint: Chest Pain


Stated complaint: CHEST PAIN


Time Seen by Provider: 19 17:25


Source: patient, EMS ( EMS documentation not available at time of chart 

dictation ), RN notes reviewed, old records reviewed


Mode of arrival: Stretcher


Limitations: No Limitations





- History of Present Illness


Initial comments: 





This is a 60-year-old gentleman.  I have evaluated this patient in the past.





Past medical history includes type 2 diabetes, hypertension, tobacco use, 

alcohol dependency, chronic pleural disease.  I have evaluated this patient 

multiple times in the past.  Earlier on this year, he had a negative nuclear 

cardiac stress test at this hospital





This patient frequently presents for abdominal pain and chest pain.  Today, the 

patient presents with a complaint of nontraumatic subxiphoid chest pain that ra

diates around to the back, present constantly for 13 hours, after doing heavy 

lifting and lifting shingles.  He denies DVT and pulmonary embolus risk factors.

 He endorses cough, tobacco use, and occasional hemoptysis.  There is no lower 

abdominal pain.  There is no vomiting.  The patient states he is not consumed 

alcohol in the past 2 days.  He is very tremulous.  He is not homicidal or 

suicidal.  He is considering detox.





He makes no extremity complaint.  He states it is very cold.








There is no diarrhea


-: Gradual, hour(s)


Location: chest


Radiation: back


Consistency: constant


Improves with: none


Worsens with: none





- Related Data


                                Home Medications











 Medication  Instructions  Recorded  Confirmed  Last Taken


 


lisinopriL [Zestril TAB] 5 mg PO QDAY 19








                                  Previous Rx's











 Medication  Instructions  Recorded  Last Taken  Type


 


Amoxicillin/Potassium Clav 1 each PO BID #20 tablet 10/22/19 Unknown Rx





[Augmentin 875-125 Tablet]    


 


Esomeprazole Magnesium [Nexium 20 mg PO DAILY #30 tab 19 Unknown Rx





24Hr]    


 


Metoclopramide [Reglan TAB] 10 mg PO QID PRN #30 tab 19 Unknown Rx


 


Multivitamin with Folic Acid [Cvs 400 mcg PO QDAY #30 tablet 19 Unknown Rx





One Daily Essential Tablet]    


 


chlordiazePOXIDE [Librium] 25 mg PO Q6H PRN #25 capsule 19 Unknown Rx











                                    Allergies











Allergy/AdvReac Type Severity Reaction Status Date / Time


 


No Known Allergies Allergy   Verified 19 15:20














ED Review of Systems


ROS: 


Stated complaint: CHEST PAIN


Other details as noted in HPI





Constitutional: malaise.  denies: fever


Eyes: denies: eye discharge


ENT: denies: congestion


Respiratory: cough.  denies: wheezing


Cardiovascular: chest pain


Gastrointestinal: denies: nausea, vomiting, diarrhea


Genitourinary: denies: dysuria


Musculoskeletal: denies: myalgia


Skin: denies: lesions


Neurological: weakness


Psychiatric: anxiety.  denies: homicidal thoughts, suicidal thoughts


Hematological/Lymphatic: denies: easy bleeding





ED Past Medical Hx





- Past Medical History


Hx Hypertension: Yes


Hx Heart Attack/AMI: No


Hx Congestive Heart Failure: No


Hx Diabetes: Yes


Hx Deep Vein Thrombosis: No


Hx GERD: Yes


Hx Liver Disease: No


Hx Asthma: No


Hx COPD: No


Additional medical history: ETOH





- Surgical History


Hx Coronary Stent: No


Hx Pacemaker: No


Hx Internal Defibrillator: No





- Social History


Smoking Status: Current Every Day Smoker


Substance Use Type: Alcohol





- Medications


Home Medications: 


                                Home Medications











 Medication  Instructions  Recorded  Confirmed  Last Taken  Type


 


lisinopriL [Zestril TAB] 5 mg PO QDAY 19 History


 


Amoxicillin/Potassium Clav 1 each PO BID #20 tablet 10/22/19  Unknown Rx





[Augmentin 875-125 Tablet]     


 


Esomeprazole Magnesium [Nexium 20 mg PO DAILY #30 tab 19  Unknown Rx





24Hr]     


 


Metoclopramide [Reglan TAB] 10 mg PO QID PRN #30 tab 19  Unknown Rx


 


Multivitamin with Folic Acid [Cvs 400 mcg PO QDAY #30 tablet 19  Unknown 

Rx





One Daily Essential Tablet]     


 


chlordiazePOXIDE [Librium] 25 mg PO Q6H PRN #25 capsule 19  Unknown Rx














ED Physical Exam





- General


Limitations: No Limitations, Physical Limitation


General appearance: alert, anxious, in distress





- Head


Head exam: Present: atraumatic, normocephalic





- Eye


Eye exam: Present: normal appearance, EOMI





- ENT


ENT exam: Present: mucous membranes dry, normal external ear exam, other (tongue

 fasciculations noted)





- Neck


Neck exam: Present: normal inspection, full ROM.  Absent: tenderness, 

meningismus





- Respiratory


Respiratory exam: Present: normal lung sounds bilaterally.  Absent: respiratory 

distress





- Cardiovascular


Cardiovascular Exam: Present: regular rate, normal rhythm, normal heart sounds. 

 Absent: bradycardia, irregular rhythm, systolic murmur, diastolic murmur, rubs,

 gallop





- GI/Abdominal


GI/Abdominal exam: Present: soft, normal bowel sounds.  Absent: distended, 

tenderness, guarding, rebound, rigid, pulsatile mass





- Rectal


Rectal exam: Present: deferred





- Extremities Exam


Extremities exam: Present: normal inspection, full ROM, other (2+ pulses noted 

in the bilateral upper and lower extremities.  There is no palpable cord.   

negative Homans sign.  Muscular compartments are soft.  The pelvis is stable.). 

 Absent: pedal edema, joint swelling, calf tenderness





- Back Exam


Back exam: Present: normal inspection, full ROM.  Absent: tenderness, CVA 

tenderness (R), CVA tenderness (L), paraspinal tenderness, vertebral tenderness





- Neurological Exam


Neurological exam: Present: alert, other (there is no facial droop.  The tongue 

is midline.  The extraocular movements are intact bilaterally.  Speaking in full

 sentences.  Minimal elevation of the base of the tongue.  There is 5 out of 5 

strength in the bilateral upper and lower extremities, and sensation is intact 

to light touch in the bilateral upper and lower extremities.  Appropriate 

insight.)





- Psychiatric


Psychiatric exam: Present: anxious.  Absent: homicidal ideation, suicidal 

ideation





- Skin


Skin exam: Present: warm, dry, intact, normal color.  Absent: rash





ED Course


                                   Vital Signs











  19





  17:26 22:19 22:30


 


Temperature 98.5 F  


 


Pulse Rate 105 H 92 H 99 H


 


Respiratory 22  16





Rate   


 


Blood Pressure 144/88 168/89 191/102


 


O2 Sat by Pulse 100 100 100





Oximetry   














  19





  23:00 23:15 23:30


 


Temperature   


 


Pulse Rate 96 H 88 86


 


Respiratory 14  





Rate   


 


Blood Pressure 158/80 140/59 154/79


 


O2 Sat by Pulse 100 94 96





Oximetry   














  19





  23:45 00:00 00:15


 


Temperature   


 


Pulse Rate 87 87 89


 


Respiratory 21 20 17





Rate   


 


Blood Pressure 148/73 156/73 158/83


 


O2 Sat by Pulse 96 98 97





Oximetry   














- Reevaluation(s)


Reevaluation #1: 





19 22:37


Differential diagnosis, including but not limited to: GERD, gastritis, hiatal 

hernia, pancreatitis, pneumonia, bronchitis, bronchiectasis, malignancy, alcohol

 withdrawal


Acute coronary syndrome











Assessment and plan: 68-year-old gentleman with active tremors, awake, alert, 

sober, tongue fasciculations, clinically appears to be in alcohol withdrawal.  

Abdomen soft and benign, without rebound, guarding or peritoneal signs.  He is 

not homicidal or suicidal and does not meet 1013 hold criteria at this time.  He

 will be given fluids, antacids, benzodiazepines, repeat laboratory studies, EKG

 pending.





Patient had a cardiac risk stratification at this hospital within the past year.

  Troponin negative times one.  EKG unchanged from prior.  This is unlikely to 

be atypical presentation of acute coronary syndrome.  He is not hypoxic, ta

chypneic.  He is intimately tachycardic, likely secondary to anxiety as well as 

alcohol withdrawal.  The patient is unlikley to have a pulmonary embolism.





Has equal pulses in the upper and lower extremities bilaterally, x-ray the chest

 is reviewed and appreciated, this is unlikely to be aortic disease.


Reevaluation #2: 





19 22:39


initial ciwa score 9


Reevaluation #3: 





19 23:14


Resting comfortably in stretcher.  Tachycardia resolved.  Repeat EKG unchanged 

from prior.  No active vomiting.


Reevaluation #4: 





19 01:52


Reassessed multiple times.  Resting comfortably.  Walking with a steady gait.  

Patient clinically sober at this time.





He may follow up as an outpatient.





ED Medical Decision Making





- Lab Data


Result diagrams: 


                                 19 17:45





                                 19 17:45








                                   Vital Signs











  19





  17:26


 


Temperature 98.5 F


 


Pulse Rate 105 H


 


Respiratory 22





Rate 


 


Blood Pressure 144/88


 


O2 Sat by Pulse 100





Oximetry 











                                   Lab Results











  19 Range/Units





  17:45 17:45 


 


WBC  8.1   (4.5-11.0)  K/mm3


 


RBC  4.77   (3.65-5.03)  M/mm3


 


Hgb  13.2   (11.8-15.2)  gm/dl


 


Hct  40.4   (35.5-45.6)  %


 


MCV  85   (84-94)  fl


 


MCH  28   (28-32)  pg


 


MCHC  33   (32-34)  %


 


RDW  17.4 H   (13.2-15.2)  %


 


Plt Count  190   (140-440)  K/mm3


 


Lymph % (Auto)  43.7 H   (13.4-35.0)  %


 


Mono % (Auto)  8.8 H   (0.0-7.3)  %


 


Eos % (Auto)  0.9   (0.0-4.3)  %


 


Baso % (Auto)  1.0   (0.0-1.8)  %


 


Lymph #  3.5   (1.2-5.4)  K/mm3


 


Mono #  0.7   (0.0-0.8)  K/mm3


 


Eos #  0.1   (0.0-0.4)  K/mm3


 


Baso #  0.1   (0.0-0.1)  K/mm3


 


Seg Neutrophils %  45.6   (40.0-70.0)  %


 


Seg Neutrophils #  3.7   (1.8-7.7)  K/mm3


 


Sodium   142  (137-145)  mmol/L


 


Potassium   5.0  (3.6-5.0)  mmol/L


 


Chloride   103.3  ()  mmol/L


 


Carbon Dioxide   22  (22-30)  mmol/L


 


Anion Gap   22  mmol/L


 


BUN   10  (9-20)  mg/dL


 


Creatinine   0.8  (0.8-1.5)  mg/dL


 


Estimated GFR   > 60  ml/min


 


BUN/Creatinine Ratio   13  %


 


Glucose   103 H  ()  mg/dL


 


Calcium   9.5  (8.4-10.2)  mg/dL


 


Troponin T   < 0.010  (0.00-0.029)  ng/mL














- EKG Data


-: EKG Interpreted by Me


EKG shows normal: sinus rhythm


Rate: tachycardia





- EKG Data





19 22:39


EKG today is unchanged from prior.  There is a sinus tachycardia, the QTC is 

prolonged, there is motion artifact, there is no ST elevation myocardial 

infarction.  Essentially unchanged from prior 10/22/2019.





- Radiology Data


Radiology results: report reviewed, image reviewed





Print Report











Referring Physician: CONSTANCE LATIF 


 


Patient Name: JANAK NESS 


 


Patient ID: [id number] 


 


YOB: 1959 


 


Sex: Male 


 


Accession: [id number] 


 


Report Date: 2019 


 


Report Status: Finalized 


 


Findings


[facility] [address] [place] [address] 

XRay Report Signed Patient: JANAK NESS MR#: [id number] : 1959 

Acct:[id number] Age/Sex: 60 / M ADM Date: 19 Loc: ED Attending Dr: 

Ordering Physician: DRISS KELLER Date of Service: 19 

Procedure(s): XR chest 1V ap Accession Number(s): [id number] cc: DRISS KELLER Fluoro Time In Minutes: CHEST 1 VIEW INDICATION / CLINICAL INFORMATION: 

Chest Pain. COMPARISON: Chest radiograph 10/22/2019





 FINDINGS: SUPPORT DEVICES: None. HEART / MEDIASTINUM: Stable. LUNGS / PLEURA: 

Unchanged appearance, with pleural parenchymal scarring at the left apex noted. 

Suggestion of volume loss in the left hemithorax with slight mediastinal shift. 

No acute airspace disease. No pleural fluid or pneumothorax. 





IMPRESSION: No acute finding. No significant change. Signer Name: Augustus Khan MD Signed: 2019 6:29 PM Workstation Name: [facility]-[id number] Transcribed 

By: DMB Dictated By: Augustus Khan MD Electronically Authenticated By: Augustus Khan MD Signed Date/Time: 19   

















Print Report











Referring Physician: MARTINE JARRELL 


 


Patient Name: JANAK NESS 


 


Patient ID: [id number] 


 


YOB: 1959 


 


Sex: Male 


 


Accession: [id number] 


 


Report Date: 2019-10-22 


 


Report Status: Finalized 


 


Findings


[facility] [address] [place] [address] 

XRay Report Signed Patient: JANAK NESS MR#: [id number] : 1959 

Acct:[id number] Age/Sex: 60 / M ADM Date: 10/22/19 Loc: ED Attending Dr: 

Ordering Physician: MARTINE JARERLL MD Date of Service: 10/22/19 Procedure(s):

 XR chest 1V ap Accession Number(s): [id number] cc: MARTINE JARRELL MD Fluoro 

Time In Minutes: CHEST 1 VIEW INDICATION / CLINICAL INFORMATION: cough hx of 

sob. COMPARISON: 2019 FINDINGS: SUPPORT DEVICES: None. HEART / MEDIASTINUM:

 No significant abnormality. LUNGS / PLEURA: Airspace disease and pleural 

thickening in the left apex and upper lobe has worsened No pneumothorax. 

ADDITIONAL FINDINGS: No significant additional findings. IMPRESSION: Airspace 

disease and pleural thickening in the left apex and upper lobe has worsened si

nce prior examination dated 2019 Signer Name: Chucho Gonzalez MD FACR 

Signed: 10/22/2019 3:53 PM Workstation Name: [id number] Transcribed By: MS 

Dictated By: Chucho Gonzalez MD Electronically Authenticated By: Chucho Gonzalez MD Signed Date/Time: 10/22/19 1553 DD/DT: 10/22/19 1551   














Critical care attestation.: 


If time is entered above; I have spent that time in minutes in the direct care 

of this critically ill patient, excluding procedure time.








ED Disposition


Clinical Impression: 


 History of chest pain, Alcohol abuse





Disposition: DC-01 TO HOME OR SELFCARE


Is pt being admited?: No


Does the pt Need Aspirin: No


Condition: Good


Additional Instructions: 


Recommend the patient discontinue or minimize alcohol consumption.  Recommend 

the patient discontinued tobacco smoking.  Take the medications as needed and 

directed, take Librium as needed for sensation of alcohol withdrawal.  Recommend

 follow-up with the primary care doctor within the next 7-10 days.  Avoid 

consumption of Motrin, ibuprofen, Naprosyn, Aleve.








Return to the emergency room right away with new, worsening, different symptoms,

 or symptoms not present on the initial emergency room evaluation.








Referrals: 


[facility] [Provider Group] - 3-5 Days


[facility] [Provider Group] - 3-5 Days


[facility] [Outside] - 3-5 Days

## 2019-12-20 NOTE — XRAY REPORT
CHEST 1 VIEW 



INDICATION / CLINICAL INFORMATION:

Chest Pain.



COMPARISON: 

Chest radiograph 10/22/2019



FINDINGS:



SUPPORT DEVICES: None.



HEART / MEDIASTINUM: Stable. 



LUNGS / PLEURA: Unchanged appearance, with pleural parenchymal scarring at the left apex noted. Sugge
stion of volume loss in the left hemithorax with slight mediastinal shift. No acute airspace disease.
 No pleural fluid or pneumothorax. 



IMPRESSION: 

No acute finding. No significant change.



Signer Name: Augustus Khan MD 

Signed: 12/20/2019 6:29 PM

 Workstation Name: [id number]

## 2020-01-08 NOTE — EMERGENCY DEPARTMENT REPORT
ED Back Pain/Injury HPI





- General


Chief Complaint: Shoulder Injury


Stated Complaint: LT SHOULDER PAIN


Time Seen by Provider: 20 20:20


Source: patient


Limitations: No Limitations





- History of Present Illness


Initial Comments: 


This is a 60 y.o. M. that presents to the ER with back pain today while at work.

 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 

yesterday. pt denies fall or trauma.  This is an acute exacerbation of a chronic

upper back problem. Current smoker and ETOH abuse. PMH of DM and HTN. 





MD Complaint: back injury


Onset/Timin


-: days(s)


Similar Symptoms Previously: Yes


Place: work


Radiation: other (right upper back and ribs )


Severity scale (0 -10): 4


Quality: aching


Consistency: intermittent


Worsens With: movement, deep breaths/cough


Context: while lifting, turning/twisting


Associated Symptoms: denies other symptoms.  denies: difficulty walking, 

difficulty urinating, incontinence, headaches, shortness of breath





- Related Data


                                Home Medications











 Medication  Instructions  Recorded  Confirmed  Last Taken


 


lisinopriL [Zestril TAB] 5 mg PO QDAY 19








                                  Previous Rx's











 Medication  Instructions  Recorded  Last Taken  Type


 


Amoxicillin/Potassium Clav 1 each PO BID #20 tablet 10/22/19 Unknown Rx





[Augmentin 875-125 Tablet]    


 


Esomeprazole Magnesium [Nexium 20 mg PO DAILY #30 tab 19 Unknown Rx





24Hr]    


 


Metoclopramide [Reglan TAB] 10 mg PO QID PRN #30 tab 19 Unknown Rx


 


Multivitamin with Folic Acid [Cvs 400 mcg PO QDAY #30 tablet 19 Unknown Rx





One Daily Essential Tablet]    


 


chlordiazePOXIDE [Librium] 25 mg PO Q6H PRN #25 capsule 19 Unknown Rx


 


Cyclobenzaprine [Flexeril] 10 mg PO TID PRN #30 tablet 20 Unknown Rx


 


Menthol/Camphor [Tiger Balm 1 applicatio TP QID PRN #1 tube 20 Unknown Rx





Ointment]    


 


Naproxen 500 mg PO BID PRN #30 tablet 20 Unknown Rx


 


predniSONE [Deltasone] 40 mg PO QDAY 5 Days #10 tab 20 Unknown Rx











                                    Allergies











Allergy/AdvReac Type Severity Reaction Status Date / Time


 


No Known Allergies Allergy   Verified 19 15:20














ED Review of Systems


ROS: 


Stated complaint: LT SHOULDER PAIN


Other details as noted in HPI





Constitutional: denies: chills, fever


Eyes: denies: eye pain, eye discharge, vision change


ENT: denies: ear pain, throat pain


Respiratory: denies: cough, shortness of breath, wheezing


Cardiovascular: denies: chest pain, palpitations


Endocrine: no symptoms reported


Gastrointestinal: denies: abdominal pain, nausea, diarrhea


Genitourinary: denies: urgency, dysuria


Musculoskeletal: back pain, arthralgia, myalgia, other (left lateral rib pain ).

  denies: joint swelling


Skin: denies: rash, lesions


Neurological: denies: headache, weakness, paresthesias


Psychiatric: denies: anxiety, depression


Hematological/Lymphatic: denies: easy bleeding, easy bruising





ED Past Medical Hx





- Past Medical History


Previous Medical History?: Yes


Hx Hypertension: Yes


Hx Heart Attack/AMI: No


Hx Congestive Heart Failure: No


Hx Diabetes: Yes


Hx Deep Vein Thrombosis: No


Hx GERD: Yes


Hx Liver Disease: No


Hx Asthma: No


Hx COPD: No


Additional medical history: ETOH





- Surgical History


Past Surgical History?: Yes


Hx Coronary Stent: No


Hx Pacemaker: No


Hx Internal Defibrillator: No


Additional Surgical History: right arm surgery- to repair artery





- Social History


Smoking Status: Current Every Day Smoker


Substance Use Type: Alcohol





- Medications


Home Medications: 


                                Home Medications











 Medication  Instructions  Recorded  Confirmed  Last Taken  Type


 


lisinopriL [Zestril TAB] 5 mg PO QDAY 19 History


 


Amoxicillin/Potassium Clav 1 each PO BID #20 tablet 10/22/19  Unknown Rx





[Augmentin 875-125 Tablet]     


 


Esomeprazole Magnesium [Nexium 20 mg PO DAILY #30 tab 19  Unknown Rx





24Hr]     


 


Metoclopramide [Reglan TAB] 10 mg PO QID PRN #30 tab 19  Unknown Rx


 


Multivitamin with Folic Acid [Cvs 400 mcg PO QDAY #30 tablet 19  Unknown 

Rx





One Daily Essential Tablet]     


 


chlordiazePOXIDE [Librium] 25 mg PO Q6H PRN #25 capsule 19  Unknown Rx


 


Cyclobenzaprine [Flexeril] 10 mg PO TID PRN #30 tablet 20  Unknown Rx


 


Menthol/Camphor [Tiger Balm 1 applicatio TP QID PRN #1 tube 20  Unknown Rx





Ointment]     


 


Naproxen 500 mg PO BID PRN #30 tablet 20  Unknown Rx


 


predniSONE [Deltasone] 40 mg PO QDAY 5 Days #10 tab 20  Unknown Rx














ED Physical Exam





- General


Limitations: No Limitations


General appearance: alert, in no apparent distress





- Head


Head exam: Present: normocephalic, normal inspection





- Expanded Head Exam


  ** Expanded


Head exam: Absent: laceration, abrasion, contusion, hematoma, racoon eyes, 

gonsalez's sign, general tenderness, tenderness of temporal artery





- Eye


Eye exam: Present: normal appearance, PERRL, EOMI


Pupils: Present: normal accommodation





- ENT


ENT exam: Present: mucous membranes moist





- Neck


Neck exam: Present: normal inspection, full ROM.  Absent: tenderness, m

eningismus, lymphadenopathy, thyromegaly





- Expanded Neck Exam


  ** Expanded


Neck exam: Absent: tenderness, midline deformity, anterior neck swelling, 

thyroid mass, carotid bruit, tracheal deviation





- Respiratory


Respiratory exam: Present: normal lung sounds bilaterally.  Absent: respiratory 

distress, wheezes, stridor, chest wall tenderness





- Cardiovascular


Cardiovascular Exam: Present: regular rate, normal rhythm, normal heart sounds. 

 Absent: systolic murmur, diastolic murmur, rubs, gallop





- GI/Abdominal


GI/Abdominal exam: Present: soft, normal bowel sounds.  Absent: distended, 

tenderness, bruit, hernia





- Rectal


Rectal exam: Present: deferred





- Extremities Exam


Extremities exam: Present: normal inspection, full ROM, normal capillary refill.

  Absent: tenderness





- Back Exam


Back exam: Present: normal inspection, full ROM, tenderness, muscle spasm, 

paraspinal tenderness.  Absent: CVA tenderness (R), CVA tenderness (L), 

vertebral tenderness, rash noted





- Expanded Back Exam


  ** Expanded


Back exam: Absent: saddle anesthesia


Back exam: Negative Straight Leg Raising: Left, Right





- Neurological Exam


Neurological exam: Present: alert, oriented X3, CN II-XII intact, normal gait, 

reflexes normal.  Absent: motor sensory deficit





- Expanded Neurological Exam


  ** Expanded


Patient oriented to: Present: person, place, time


Speech: Present: fluid speech


Cranial nerves: EOM's Intact: Normal, Gag Reflex: Normal, Tongue Deviation: 

Normal, Nystagmus: Normal, Facial Sensation: Normal


Motor strength exam: RUE: 5, LUE: 5, RLE: 5, LLE: 5


Best Eye Response (Ronni): (4) open spontaneously


Best Motor Response (Ronni): (6) obeys commands


Best Verbal Response (Ronni): (5) oriented


False Pass Total: 15





ED Course


                                   Vital Signs











  20





  15:44 20:22


 


Temperature 97.7 F 


 


Pulse Rate 112 H 104 H


 


Respiratory 18 20





Rate  


 


Blood Pressure 125/76 120/56


 


O2 Sat by Pulse 100 100





Oximetry  














ED Medical Decision Making





- Radiology Data


Radiology results: report reviewed, image reviewed








Ordering Physician: DRISS KELLER


Date of Service: 20


Procedure(s): XR spine thoracic 2V


Accession Number(s): [id number]





cc: DRISS KELLER





Fluoro Time In Minutes:





THORACIC SPINE, AP AND LATERAL VIEWS





INDICATION:


back pain.





COMPARISON:


Chest radiograph 2019.





FINDINGS:


No fracture or subluxation is seen. There is mild discogenic degenerative change

 in the upper


thoracic spine. Alignment is within normal limits.





There is pleural parenchymal scarring in the left upper hemithorax, this is 

similar to prior chest


radiograph from 2019.





IMPRESSION:


1. No acute findings.











Signer Name: Sean Velez MD


Signed: 2020 9:26 PM


Workstation Name: [id number]








Transcribed By: 


Dictated By: Sean Velez MD


Electronically Authenticated By: Sean Velez MD


Signed Date/Time: 20











DD/DT: 20


TD/TT:








- Medical Decision Making


 xray: mild degenerative changes, no acute fracture, plan: dc to home with rx 

for NDAIDs, Muscle relaxant, prednisone, analgesic balm follow up with ortho in 

2-3 days , pt verbalized agreement and understanding of discharge plan. 





Critical care attestation.: 


If time is entered above; I have spent that time in minutes in the direct care 

of this critically ill patient, excluding procedure time.








ED Disposition


Clinical Impression: 


 Upper back pain on left side, Chronic upper back pain





Disposition: DC-01 TO HOME OR SELFCARE


Is pt being admited?: No


Does the pt Need Aspirin: No


Condition: Stable


Instructions:  Arthralgia (ED), Chronic Back Pain (ED)


Prescriptions: 


predniSONE [Deltasone] 40 mg PO QDAY 5 Days #10 tab


Cyclobenzaprine [Flexeril] 10 mg PO TID PRN #30 tablet


 PRN Reason: Muscle Spasm


Naproxen 500 mg PO BID PRN #30 tablet


 PRN Reason: pain


Menthol/Camphor [Tiger Balm Ointment] 1 applicatio TP QID PRN #1 tube


 PRN Reason: Pain , Severe (7-10)


Referrals: 


ANDRES MIRANDA MD [Staff Physician] - 3-5 Days


Forms:  Work/School Release Form(ED)


Time of Disposition: 00:39

## 2020-01-08 NOTE — XRAY REPORT
THORACIC SPINE, AP AND LATERAL VIEWS



INDICATION:

back pain.



COMPARISON:

Chest radiograph 12/20/2019.



FINDINGS:

No fracture or subluxation is seen. There is mild discogenic degenerative change in the upper thoraci
c spine. Alignment is within normal limits.



There is pleural parenchymal scarring in the left upper hemithorax, this is similar to prior chest ra
diograph from 12/20/2019.



IMPRESSION:

1. No acute findings.







Signer Name: Sean Velez MD 

Signed: 1/8/2020 9:26 PM

 Workstation Name: [id number]

## 2020-01-08 NOTE — EVENT NOTE
ED Screening Note


Date of service: 01/08/20


Time: 20:22


ED Screening Note: 





This is a 60 y.o. M. that presents to the ER with back pain today while at work.





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





Current smoker and ETOH abuse.





PMH of DM and HTN





This initial assessment/diagnostic orders/clinical plan/treatment(s) is/are 

subject to change based on patients health status, clinical progression and re-

assessment by fellow clinical providers in the ED. Further treatment and workup 

at subsequent clinical providers discretion. Patient/guardian urged not to elope

from the ED as their condition may be serious if not clinically assessed and 

managed. 





Initial orders include: 





XR thoracic spine


Given analgesics

## 2020-03-04 NOTE — EMERGENCY DEPARTMENT REPORT
ED Fall HPI





- General


Chief Complaint: Back Pain/Injury


Stated Complaint: BACK PAIN


Time Seen by Provider: 03/04/20 14:20


Source: patient, EMS


Mode of arrival: Stretcher





- History of Present Illness


Initial Comments: 





Mr. Dacosta is a 61 yo male with hx of HTN, DM, GERD, alcohol abuse who presents

with lower left back pain after falling down several steps while at work.  

Severe sharp pain.  Arrived per EMS.


MD Complaint: fall


-: Sudden, This afternoon


Fall From: down stairs (#)


When Fall Occurred: 1 hour PTA


Place Fall Occurred: work


Loss of Consciousness: none


Prolonged Down Time?: no, yes


Location: back


Severity: severe


Severity scale (0 -10): 10


Quality: sharp


Context: tripped/slipped


Associated Symptoms: denies





- Related Data


                                Home Medications











 Medication  Instructions  Recorded  Confirmed  Last Taken


 


lisinopriL [Zestril TAB] 5 mg PO QDAY 08/23/19 08/23/19 08/22/19








                                  Previous Rx's











 Medication  Instructions  Recorded  Last Taken  Type


 


Amoxicillin/Potassium Clav 1 each PO BID #20 tablet 10/22/19 Unknown Rx





[Augmentin 875-125 Tablet]    


 


Esomeprazole Magnesium [Nexium 20 mg PO DAILY #30 tab 12/21/19 Unknown Rx





24Hr]    


 


Metoclopramide [Reglan TAB] 10 mg PO QID PRN #30 tab 12/21/19 Unknown Rx


 


Multivitamin with Folic Acid [Cvs 400 mcg PO QDAY #30 tablet 12/21/19 Unknown Rx





One Daily Essential Tablet]    


 


chlordiazePOXIDE [Librium] 25 mg PO Q6H PRN #25 capsule 12/21/19 Unknown Rx


 


Cyclobenzaprine [Flexeril] 10 mg PO TID PRN #30 tablet 01/09/20 Unknown Rx


 


Menthol/Camphor [Tiger Balm 1 applicatio TP QID PRN #1 tube 01/09/20 Unknown Rx





Ointment]    


 


Naproxen 500 mg PO BID PRN #30 tablet 01/09/20 Unknown Rx


 


predniSONE [Deltasone] 40 mg PO QDAY 5 Days #10 tab 01/09/20 Unknown Rx


 


Cyclobenzaprine [Flexeril] 10 mg PO TID PRN #20 tablet 03/04/20 Unknown Rx


 


HYDROcodone/APAP 5-325 [[place] 1 each PO Q6HR PRN #10 tablet 03/04/20 Unknown Rx





5/325]    


 


Ibuprofen [Motrin 400 MG tab] 400 mg PO TID 5 Days #15 tablet 03/04/20 Unknown 

Rx











                                    Allergies











Allergy/AdvReac Type Severity Reaction Status Date / Time


 


No Known Allergies Allergy   Verified 12/20/19 15:20














ED Review of Systems


ROS: 


Stated complaint: BACK PAIN


Other details as noted in HPI





Constitutional: denies: fever, malaise


Respiratory: denies: cough


Cardiovascular: denies: as per HPI


Gastrointestinal: denies: abdominal pain


Musculoskeletal: back pain


Neurological: denies: numbness, paresthesias, abnormal gait





ED Past Medical Hx





- Past Medical History


Previous Medical History?: Yes


Hx Hypertension: Yes


Hx Heart Attack/AMI: No


Hx Congestive Heart Failure: No


Hx Diabetes: Yes


Hx Deep Vein Thrombosis: No


Hx GERD: Yes


Hx Liver Disease: No


Hx Asthma: No


Hx COPD: No


Additional medical history: ETOH





- Surgical History


Hx Coronary Stent: No


Hx Pacemaker: No


Hx Internal Defibrillator: No


Additional Surgical History: right arm surgery- to repair artery





- Social History


Smoking Status: Current Every Day Smoker


Substance Use Type: Alcohol





- Medications


Home Medications: 


                                Home Medications











 Medication  Instructions  Recorded  Confirmed  Last Taken  Type


 


lisinopriL [Zestril TAB] 5 mg PO QDAY 08/23/19 08/23/19 08/22/19 History


 


Amoxicillin/Potassium Clav 1 each PO BID #20 tablet 10/22/19  Unknown Rx





[Augmentin 875-125 Tablet]     


 


Esomeprazole Magnesium [Nexium 20 mg PO DAILY #30 tab 12/21/19  Unknown Rx





24Hr]     


 


Metoclopramide [Reglan TAB] 10 mg PO QID PRN #30 tab 12/21/19  Unknown Rx


 


Multivitamin with Folic Acid [Cvs 400 mcg PO QDAY #30 tablet 12/21/19  Unknown 

Rx





One Daily Essential Tablet]     


 


chlordiazePOXIDE [Librium] 25 mg PO Q6H PRN #25 capsule 12/21/19  Unknown Rx


 


Cyclobenzaprine [Flexeril] 10 mg PO TID PRN #30 tablet 01/09/20  Unknown Rx


 


Menthol/Camphor [Tiger Balm 1 applicatio TP QID PRN #1 tube 01/09/20  Unknown Rx





Ointment]     


 


Naproxen 500 mg PO BID PRN #30 tablet 01/09/20  Unknown Rx


 


predniSONE [Deltasone] 40 mg PO QDAY 5 Days #10 tab 01/09/20  Unknown Rx


 


Cyclobenzaprine [Flexeril] 10 mg PO TID PRN #20 tablet 03/04/20  Unknown Rx


 


HYDROcodone/APAP 5-325 [[place] 1 each PO Q6HR PRN #10 tablet 03/04/20  Unknown Rx





5/325]     


 


Ibuprofen [Motrin 400 MG tab] 400 mg PO TID 5 Days #15 tablet 03/04/20  Unknown 

Rx














ED Physical Exam





- General


Limitations: No Limitations


General appearance: alert, other (appears in severe pain)





- Head


Head exam: Present: atraumatic, normocephalic





- Eye


Eye exam: Present: normal appearance





- ENT


ENT exam: Present: mucous membranes moist





- Neck


Neck exam: Present: normal inspection, full ROM





- Respiratory


Respiratory exam: Present: normal lung sounds bilaterally.  Absent: respiratory 

distress, wheezes, rales, rhonchi





- Cardiovascular


Cardiovascular Exam: Present: regular rate, normal rhythm, normal heart sounds. 

Absent: systolic murmur, diastolic murmur, rubs, gallop





- GI/Abdominal


GI/Abdominal exam: Present: soft, normal bowel sounds.  Absent: distended, 

tenderness, guarding, rebound





- Rectal


Rectal exam: Present: deferred





- Extremities Exam


Extremities exam: Present: normal inspection





- Back Exam


Back exam: Present: normal inspection, full ROM.  Absent: tenderness, CVA 

tenderness (R), CVA tenderness (L), muscle spasm, paraspinal tenderness, v

ertebral tenderness, rash noted





- Neurological Exam


Neurological exam: Present: alert, oriented X3, normal gait





- Psychiatric


Psychiatric exam: Present: normal affect, normal mood





- Skin


Skin exam: Present: warm, dry, intact, normal color.  Absent: rash





ED Course


                                   Vital Signs











  03/04/20





  13:58


 


Temperature 97.6 F


 


Pulse Rate 98 H


 


Respiratory 20





Rate 


 


Blood Pressure 145/95





[Right] 


 


O2 Sat by Pulse 100





Oximetry 














ED Medical Decision Making





- Radiology Data


Radiology results: image reviewed





Lumbosacral radiographs: No fracture no subluxation, +DDD with osteophytes my 

personal interpretation





- Medical Decision Making





Mr. Dacosta presents with severe lower left back pain after fall down several 

steps.  Radiographs do not reveal any acute injury.  Pain was controlled with IV

 analgesia in the emergency department.  Prescribed Flexeril ibuprofen and 

Norco.  Provided outpatient medicine physician referral.


Critical care attestation.: 


If time is entered above; I have spent that time in minutes in the direct care 

of this critically ill patient, excluding procedure time.








ED Disposition


Clinical Impression: 


 Acute back pain, Fall, Lumbar degenerative disc disease





Disposition: DC-01 TO HOME OR SELFCARE


Is pt being admited?: No


Does the pt Need Aspirin: No


Condition: Stable


Instructions:  Low Back Strain (ED)


Prescriptions: 


Cyclobenzaprine [Flexeril] 10 mg PO TID PRN #20 tablet


 PRN Reason: Muscle Spasm


Ibuprofen [Motrin 400 MG tab] 400 mg PO TID 5 Days #15 tablet


HYDROcodone/APAP 5-325 [[place] 5/325] 1 each PO Q6HR PRN #10 tablet


 PRN Reason: Pain


Referrals: 


[facility] MD [Primary Care Provider] - 3-5 Days


LINNETTE TOLLIVER MD [Staff Physician] - 3-5 Days

## 2020-03-05 NOTE — XRAY REPORT
LUMBAR SPINE 3 VIEWS



INDICATION / CLINICAL INFORMATION:

FALL. . Pain.



COMPARISON: 

None available.



FINDINGS:

VERTEBRAE: No acute fracture. No significant malalignment.



DISC SPACES / FACET JOINTS:Degenerative disc disease at L4-5 with minimal disc space narrowing and pr
ominent anterior and lateral osteophytes.



PARASPINAL SOFT TISSUES:No significant abnormality.



ADDITIONAL FINDINGS: None.



IMPRESSION:

1. No acute findings.

2. Degenerative disc disease.



Signer Name: Josue Kruse MD 

Signed: 3/4/2020 4:07 PM

Workstation Name: [id number]

## 2020-03-07 NOTE — EMERGENCY DEPARTMENT REPORT
ED Back Pain/Injury HPI





- General


Chief Complaint: Back Pain/Injury


Stated Complaint: BACK PAIN


Time Seen by Provider: 03/07/20 02:18


Source: patient, EMS


Limitations: No Limitations





- History of Present Illness


Initial Comments: 





60-year-old male with history of chronic back pain presents to ED with complaint

of back pain.  Patient reports that he fell at work 3 days ago.  Patient was 

seen following that fall and given a prescription for Norco, ibuprofen, and 

Flexeril.  Patient did not get these prescriptions filled, states he cannot 

afford them, he currently has the paperwork and the unfilled prescriptions with 

him.  Patient reports the pain is in the same location as it was 3 days ago.  He

reports lumbar pain bilaterally.  Patient denies any injury or fall since his l

ast ED visit.  Patient states the pain improved, however he was laying in bed 

all day and after he got up, the back pain returned.  Patient denies any fever, 

difficulty with urination, urinary bowel incontinence.


MD Complaint: back pain


Similar Symptoms Previously: Yes


Place: home


Radiation: none


Severity: moderate


Quality: aching


Consistency: intermittent


Improves With: immobilization


Worsens With: movement


Context: fall


Associated Symptoms: denies: numbness, incontinence, fever/chills





- Related Data


                                Home Medications











 Medication  Instructions  Recorded  Confirmed  Last Taken


 


lisinopriL [Zestril TAB] 5 mg PO QDAY 08/23/19 08/23/19 08/22/19








                                  Previous Rx's











 Medication  Instructions  Recorded  Last Taken  Type


 


Amoxicillin/Potassium Clav 1 each PO BID #20 tablet 10/22/19 Unknown Rx





[Augmentin 875-125 Tablet]    


 


Esomeprazole Magnesium [Nexium 20 mg PO DAILY #30 tab 12/21/19 Unknown Rx





24Hr]    


 


Metoclopramide [Reglan TAB] 10 mg PO QID PRN #30 tab 12/21/19 Unknown Rx


 


Multivitamin with Folic Acid [Cvs 400 mcg PO QDAY #30 tablet 12/21/19 Unknown Rx





One Daily Essential Tablet]    


 


chlordiazePOXIDE [Librium] 25 mg PO Q6H PRN #25 capsule 12/21/19 Unknown Rx


 


Cyclobenzaprine [Flexeril] 10 mg PO TID PRN #30 tablet 01/09/20 Unknown Rx


 


Menthol/Camphor [Tiger Balm 1 applicatio TP QID PRN #1 tube 01/09/20 Unknown Rx





Ointment]    


 


Naproxen 500 mg PO BID PRN #30 tablet 01/09/20 Unknown Rx


 


predniSONE [Deltasone] 40 mg PO QDAY 5 Days #10 tab 01/09/20 Unknown Rx


 


Cyclobenzaprine [Flexeril] 10 mg PO TID PRN #20 tablet 03/04/20 Unknown Rx


 


HYDROcodone/APAP 5-325 [[place] 1 each PO Q6HR PRN #10 tablet 03/04/20 Unknown Rx





5/325]    


 


Ibuprofen [Motrin 400 MG tab] 400 mg PO TID 5 Days #15 tablet 03/04/20 Unknown 

Rx











                                    Allergies











Allergy/AdvReac Type Severity Reaction Status Date / Time


 


No Known Allergies Allergy   Verified 12/20/19 15:20














ED Review of Systems


ROS: 


Stated complaint: BACK PAIN


Other details as noted in HPI





Comment: All other systems reviewed and negative


Constitutional: denies: chills, fever


Gastrointestinal: denies: abdominal pain


Musculoskeletal: back pain


Neurological: denies: weakness, numbness





ED Past Medical Hx





- Past Medical History


Previous Medical History?: Yes


Hx Hypertension: Yes


Hx Heart Attack/AMI: No


Hx Congestive Heart Failure: No


Hx Diabetes: Yes


Hx Deep Vein Thrombosis: No


Hx GERD: Yes


Hx Liver Disease: No


Hx Asthma: No


Hx COPD: No


Additional medical history: ETOH





- Surgical History


Past Surgical History?: Yes


Hx Coronary Stent: No


Hx Pacemaker: No


Hx Internal Defibrillator: No


Additional Surgical History: right arm surgery- to repair artery





- Social History


Smoking Status: Current Every Day Smoker


Substance Use Type: Alcohol





- Medications


Home Medications: 


                                Home Medications











 Medication  Instructions  Recorded  Confirmed  Last Taken  Type


 


lisinopriL [Zestril TAB] 5 mg PO QDAY 08/23/19 08/23/19 08/22/19 History


 


Amoxicillin/Potassium Clav 1 each PO BID #20 tablet 10/22/19  Unknown Rx





[Augmentin 875-125 Tablet]     


 


Esomeprazole Magnesium [Nexium 20 mg PO DAILY #30 tab 12/21/19  Unknown Rx





24Hr]     


 


Metoclopramide [Reglan TAB] 10 mg PO QID PRN #30 tab 12/21/19  Unknown Rx


 


Multivitamin with Folic Acid [Cvs 400 mcg PO QDAY #30 tablet 12/21/19  Unknown 

Rx





One Daily Essential Tablet]     


 


chlordiazePOXIDE [Librium] 25 mg PO Q6H PRN #25 capsule 12/21/19  Unknown Rx


 


Cyclobenzaprine [Flexeril] 10 mg PO TID PRN #30 tablet 01/09/20  Unknown Rx


 


Menthol/Camphor [Tiger Balm 1 applicatio TP QID PRN #1 tube 01/09/20  Unknown Rx





Ointment]     


 


Naproxen 500 mg PO BID PRN #30 tablet 01/09/20  Unknown Rx


 


predniSONE [Deltasone] 40 mg PO QDAY 5 Days #10 tab 01/09/20  Unknown Rx


 


Cyclobenzaprine [Flexeril] 10 mg PO TID PRN #20 tablet 03/04/20  Unknown Rx


 


HYDROcodone/APAP 5-325 [[place] 1 each PO Q6HR PRN #10 tablet 03/04/20  Unknown Rx





5/325]     


 


Ibuprofen [Motrin 400 MG tab] 400 mg PO TID 5 Days #15 tablet 03/04/20  Unknown 

Rx














ED Physical Exam





- General


Limitations: No Limitations


General appearance: alert, in no apparent distress





- Head


Head exam: Present: atraumatic, normocephalic





- Eye


Eye exam: Present: normal appearance





- ENT


ENT exam: Present: mucous membranes moist





- Neck


Neck exam: Present: normal inspection





- Respiratory


Respiratory exam: Present: normal lung sounds bilaterally.  Absent: respiratory 

distress





- Cardiovascular


Cardiovascular Exam: Present: normal rhythm, tachycardia





- GI/Abdominal


GI/Abdominal exam: Present: soft.  Absent: distended, tenderness





- Extremities Exam


Extremities exam: Present: other (mild swelling to left foot, no erythema or 

deformity)





- Back Exam


Back exam: Present: paraspinal tenderness (bilateral lumbar region), other (pt 

ambulatory without assistance)





- Neurological Exam


Neurological exam: Present: alert, oriented X3.  Absent: motor sensory deficit





- Psychiatric


Psychiatric exam: Present: normal affect, normal mood





- Skin


Skin exam: Present: warm, dry, intact, normal color





ED Course


                                   Vital Signs











  03/07/20 03/07/20





  02:11 02:39


 


Temperature 97.7 F 


 


Pulse Rate 123 H 89


 


Respiratory 24 18





Rate  


 


Blood Pressure 149/96 


 


Blood Pressure  138/77





[Left]  


 


O2 Sat by Pulse 100 100





Oximetry  














ED Medical Decision Making





- Medical Decision Making





Patient seen 3 days ago for same.  Previous ED visits also report that patient 

has history of chronic back pain.  Patient reports fall 3 days ago denies any 

trauma since then.  Lumbar films from 3 days ago did not show any acute fracture

 or dislocation.  At triage patient was tachycardic, however 30 minutes later 

vital signs have normalized.  He is afebrile.  Patient is ambulatory without 

assistance.  Gait is normal.  He has been advised to get his prescriptions 

filled.  Good Rx card was given to patient.  Outpatient follow-up advised.  

Return precautions given.





- Differential Diagnosis


chronic back pain, acute on chronic back pain


Critical care attestation.: 


If time is entered above; I have spent that time in minutes in the direct care 

of this critically ill patient, excluding procedure time.








ED Disposition


Clinical Impression: 


 Back pain





Disposition: Z-07 MED SCREENING EXAM-LEFT


Is pt being admited?: No


Condition: Stable


Instructions:  Back Pain (ED)


Referrals: 


[facility] MD [Primary Care Provider] - 3-5 Days


ANDRES MIRANDA MD [Staff Physician] - 3-5 Days


Time of Disposition: 02:41